# Patient Record
Sex: FEMALE | Race: WHITE | NOT HISPANIC OR LATINO
[De-identification: names, ages, dates, MRNs, and addresses within clinical notes are randomized per-mention and may not be internally consistent; named-entity substitution may affect disease eponyms.]

---

## 2017-02-01 ENCOUNTER — LAB SERVICES (OUTPATIENT)
Dept: OTHER | Age: 37
End: 2017-02-01

## 2017-02-01 ENCOUNTER — AMBULATORY - HEALTHEAST (OUTPATIENT)
Dept: MULTI SPECIALTY CLINIC | Facility: CLINIC | Age: 37
End: 2017-02-01

## 2017-02-01 ENCOUNTER — MYAURORA ACCOUNT LINK (OUTPATIENT)
Dept: OTHER | Age: 37
End: 2017-02-01

## 2017-02-01 ENCOUNTER — CHARTING TRANS (OUTPATIENT)
Dept: OBGYN | Age: 37
End: 2017-02-01

## 2017-02-01 LAB
HPV_EXT - HISTORICAL: NORMAL
PAP SMEAR - HIM PATIENT REPORTED: NORMAL

## 2017-02-07 LAB — AP REPORT: NORMAL

## 2017-02-09 ENCOUNTER — LAB SERVICES (OUTPATIENT)
Dept: OTHER | Age: 37
End: 2017-02-09

## 2017-02-10 LAB
ESTRADIOL SERPL-MCNC: 43.37 PG/ML
FOLLICLE STIMULATING: 4.75 M[IU]/ML (ref 2–25)
HPV I/H RISK 4 DNA CVX QL NAA+PROBE: NORMAL

## 2017-02-12 LAB — MIS SERPL-MCNC: 2.72 NG/ML (ref 0.18–11.71)

## 2017-10-03 ENCOUNTER — LAB SERVICES (OUTPATIENT)
Dept: OTHER | Age: 37
End: 2017-10-03

## 2017-10-03 ENCOUNTER — MYAURORA ACCOUNT LINK (OUTPATIENT)
Dept: OTHER | Age: 37
End: 2017-10-03

## 2017-10-03 ENCOUNTER — CHARTING TRANS (OUTPATIENT)
Dept: URGENT CARE | Age: 37
End: 2017-10-03

## 2017-10-03 LAB — DEPRECATED S PYO AG THROAT QL EIA: NEGATIVE

## 2017-10-03 ASSESSMENT — PAIN SCALES - GENERAL: PAINLEVEL_OUTOF10: 5

## 2018-01-29 ENCOUNTER — CHARTING TRANS (OUTPATIENT)
Dept: OTHER | Age: 38
End: 2018-01-29

## 2018-02-05 ENCOUNTER — CHARTING TRANS (OUTPATIENT)
Dept: OTHER | Age: 38
End: 2018-02-05

## 2018-11-28 VITALS
TEMPERATURE: 97.2 F | RESPIRATION RATE: 14 BRPM | HEART RATE: 72 BPM | DIASTOLIC BLOOD PRESSURE: 66 MMHG | OXYGEN SATURATION: 100 % | SYSTOLIC BLOOD PRESSURE: 102 MMHG

## 2018-11-29 VITALS
DIASTOLIC BLOOD PRESSURE: 64 MMHG | BODY MASS INDEX: 22.71 KG/M2 | WEIGHT: 133 LBS | HEIGHT: 64 IN | SYSTOLIC BLOOD PRESSURE: 106 MMHG

## 2020-03-11 ENCOUNTER — OFFICE VISIT - HEALTHEAST (OUTPATIENT)
Dept: FAMILY MEDICINE | Facility: CLINIC | Age: 40
End: 2020-03-11

## 2020-03-11 DIAGNOSIS — Z00.00 ROUTINE GENERAL MEDICAL EXAMINATION AT A HEALTH CARE FACILITY: ICD-10-CM

## 2020-03-11 DIAGNOSIS — M75.82 ROTATOR CUFF TENDONITIS, LEFT: ICD-10-CM

## 2020-03-11 DIAGNOSIS — Z13.0 SCREENING FOR DEFICIENCY ANEMIA: ICD-10-CM

## 2020-03-11 DIAGNOSIS — Z83.3 FAMILY HISTORY OF DIABETES MELLITUS: ICD-10-CM

## 2020-03-11 DIAGNOSIS — Z13.220 SCREENING FOR CHOLESTEROL LEVEL: ICD-10-CM

## 2020-03-11 DIAGNOSIS — Z12.31 ENCOUNTER FOR SCREENING MAMMOGRAM FOR BREAST CANCER: ICD-10-CM

## 2020-03-11 LAB
ALBUMIN SERPL-MCNC: 4.1 G/DL (ref 3.5–5)
ALP SERPL-CCNC: 67 U/L (ref 45–120)
ALT SERPL W P-5'-P-CCNC: 25 U/L (ref 0–45)
ANION GAP SERPL CALCULATED.3IONS-SCNC: 9 MMOL/L (ref 5–18)
AST SERPL W P-5'-P-CCNC: 21 U/L (ref 0–40)
BILIRUB SERPL-MCNC: 0.6 MG/DL (ref 0–1)
BUN SERPL-MCNC: 7 MG/DL (ref 8–22)
CALCIUM SERPL-MCNC: 9.3 MG/DL (ref 8.5–10.5)
CHLORIDE BLD-SCNC: 105 MMOL/L (ref 98–107)
CHOLEST SERPL-MCNC: 216 MG/DL
CO2 SERPL-SCNC: 25 MMOL/L (ref 22–31)
CREAT SERPL-MCNC: 0.65 MG/DL (ref 0.6–1.1)
FASTING STATUS PATIENT QL REPORTED: YES
GFR SERPL CREATININE-BSD FRML MDRD: >60 ML/MIN/1.73M2
GLUCOSE BLD-MCNC: 88 MG/DL (ref 70–125)
HBA1C MFR BLD: 5 %
HDLC SERPL-MCNC: 36 MG/DL
HGB BLD-MCNC: 13.5 G/DL (ref 12–16)
LDLC SERPL CALC-MCNC: 144 MG/DL
POTASSIUM BLD-SCNC: 4.2 MMOL/L (ref 3.5–5)
PROT SERPL-MCNC: 6.8 G/DL (ref 6–8)
SODIUM SERPL-SCNC: 139 MMOL/L (ref 136–145)
TRIGL SERPL-MCNC: 182 MG/DL

## 2020-03-11 ASSESSMENT — MIFFLIN-ST. JEOR: SCORE: 1293.65

## 2020-06-30 ENCOUNTER — HOSPITAL ENCOUNTER (OUTPATIENT)
Dept: MAMMOGRAPHY | Facility: CLINIC | Age: 40
Discharge: HOME | End: 2020-06-30
Attending: NURSE PRACTITIONER

## 2020-06-30 DIAGNOSIS — Z12.31 ENCOUNTER FOR SCREENING MAMMOGRAM FOR BREAST CANCER: ICD-10-CM

## 2020-07-09 ENCOUNTER — HOSPITAL ENCOUNTER (OUTPATIENT)
Dept: MAMMOGRAPHY | Facility: CLINIC | Age: 40
Discharge: HOME | End: 2020-07-09
Attending: NURSE PRACTITIONER

## 2020-07-09 DIAGNOSIS — N64.89 BREAST ASYMMETRY: ICD-10-CM

## 2020-10-21 ENCOUNTER — OFFICE VISIT - HEALTHEAST (OUTPATIENT)
Dept: PHYSICAL THERAPY | Facility: REHABILITATION | Age: 40
End: 2020-10-21

## 2020-10-21 DIAGNOSIS — M75.82 ROTATOR CUFF TENDONITIS, LEFT: ICD-10-CM

## 2020-11-20 ENCOUNTER — OFFICE VISIT - HEALTHEAST (OUTPATIENT)
Dept: PHYSICAL THERAPY | Facility: REHABILITATION | Age: 40
End: 2020-11-20

## 2020-11-20 DIAGNOSIS — M75.82 ROTATOR CUFF TENDONITIS, LEFT: ICD-10-CM

## 2020-11-21 ENCOUNTER — AMBULATORY - HEALTHEAST (OUTPATIENT)
Dept: NURSING | Facility: CLINIC | Age: 40
End: 2020-11-21

## 2021-06-04 VITALS
BODY MASS INDEX: 23.53 KG/M2 | WEIGHT: 141.25 LBS | HEIGHT: 65 IN | DIASTOLIC BLOOD PRESSURE: 76 MMHG | HEART RATE: 76 BPM | SYSTOLIC BLOOD PRESSURE: 110 MMHG

## 2021-06-06 NOTE — PROGRESS NOTES
FEMALE PREVENTIVE EXAM    Assessment & Plan   1. Routine general medical examination at a health care facility  Fasting labs. Up to date on pap, due in 2022  - Influenza, Seasonal Quad, PF =/> 6months    2. Rotator cuff tendonitis, left  Exam and history consistent with rotator cuff tendonitis.  Discussed low concern for tear based on age and lack of injury so would not recommend advanced imaging currently.  Referral to physical therapy for treatment and if no improvement consider MRI  - Ambulatory referral to Physical Therapy    3. Family history of diabetes mellitus  Mother and brother. Counseled on risk, lifestyle interventions and recommendation for annual screening.   - Comprehensive Metabolic Panel  - Glycosylated Hemoglobin A1c    4. Encounter for screening mammogram for breast cancer  - Mammo Screening Bilateral; Future    5. Screening for cholesterol level  - Lipid Cascade    6. Screening for deficiency anemia  - Hemoglobin    Recommend repeat pap smear if normal every five years, Recommended adequate calcium intake/osteoporosis prevention, Discussed breast cancer screening guidelines, Discussed colon cancer screening at age 50, 45 if -American and Discussed diet, including moderation of portions sizes, avoiding eating out and fast food and increase in fruits and vegetables    Sherry Seymour CNP    Subjective:   Chief Complaint:  Establish Care and Annual Exam (fasting - not due for a pap)    HPI:  Deborah Patten is a 39 y.o. female who presents for routine physical exam.  She is a new patient.  Most recently seen in [place]. PMH notable for depression, anxiety. Works as an . .      [facility] diabetes in mother and brother.  She admits she has not been exercising regularly due to long work hours for the past 2-3 months.  She typically works out 2-3 times a week. Diet is relatively healthy.  Concerned about own risks and has noted some fatigue and increased thirst recently.      Left  "shoulder:  Pain for the last 3-6 months with reaching and overhead movements.  Pain with lying on the left side in bed. No known injury but has generally been active with working out.  No weakness noted. No numbness/tingling. No prior history of injury or pain.      OB/Gyn History:   Menstrual history: regular periods  Date of previous pap: 2017  History of abnormal pap: none    Preventive Health:  Reviewed and recommended screening and treatment recommendations:  Mammography: not yet started  Colonoscopy: No [facility]  Immunizations: up to date    Health Habits:    Exercise: typically 2-3 times a week, not currently due to work.  Calcium intake/Osteoporosis prevention: MVI    PMH:   There is no problem list on file for this patient.      No past medical history on file.    Current Medications: Reviewed   No current outpatient medications on file prior to visit.     No current facility-administered medications on file prior to visit.        Allergies:  Reviewed  is allergic to septra [sulfamethoxazole-trimethoprim].    Social History:  Social History     Occupational History     Not on file   Tobacco Use     Smoking status: Never Smoker     Smokeless tobacco: Never Used   Substance and Sexual Activity     Alcohol use: Not on file     Drug use: Not on file     Sexual activity: Not on file       Family History:   No family history on file.      Review of Systems:  Complete head to toe review of systems is otherwise negative except as above.    Objective:    /76 (Patient Site: Right Arm, Patient Position: Sitting, Cuff Size: Adult Regular)   Pulse 76   Ht 5' 4.5\" (1.638 m)   Wt 141 lb 4 oz (64.1 kg)   LMP 2020 (Exact Date)   Breastfeeding No   BMI 23.87 kg/m      GENERAL: Alert, well-appearing female .   PSYCH: Pleasant mood, affect appropriate  SKIN: No atypical lesions  EYES: Conjunctiva pink, sclera white, no exudates. GALI.  EOMs intact.   EARS: TMs pearly grey, no bulging, redness, retraction. "   MOUTH: Pharynx moist, pink without exudate. No tonsillar enlargement  NECK: No lymphadenopathy. Thyroid borders smooth without enlargement, nodules.   CV: Regular rate and rhythm without murmurs, rubs or gallops.  RESP: Lung sounds clear   ABDOMEN: BS+. Abdomen soft.  No organomegaly  BREASTS: Breasts symmetric, no dimpling, masses or skin discolorations seen. Areolas and nipples symmetric without discharge. On palpation, breast tissue supple and nontender. No masses or nodules. Axillary and epitrochlear lymph nodes nonpalpable.   PV :  No edema  MSK:  Shoulders in alignment. No AC or SC TTP.  Full ROM of shoulder, pain with abduction past 135 degrees. Negative impingement testing.  UE strength 5/5 bilaterally.  No biceps tenderness.

## 2021-06-06 NOTE — PATIENT INSTRUCTIONS - HE
Recommendations from today's visit                                                       1. Left shoulder:  This seems most consistent with rotator cuff tendonitis.  I recommend physical therapy and placed a referral order for this.      2. Health maintenance:  We order a screening mammogram today.  We will continue with annual mammograms until the age of at least 55 and then could consider adjusting frequency. I do recommend a breast exam in clinic every year and annual diabetes screening with labs as well.        Next appointment: one year    To reschedule your appointment, please call the clinic directly at [phone].   It was a pleasure seeing you today! I look forward to seeing you again.

## 2021-06-12 NOTE — PROGRESS NOTES
[facility]   Shoulder Initial Evaluation    Patient Name: Breanna Patten  Date of evaluation: 10/21/2020  Referral Diagnosis: Rotator cuff tendonitis, left  Referring provider: Sherry Seymour CNP  Visit Diagnosis:     ICD-10-CM    1. Rotator cuff tendonitis, left  M75.82        Precautions / Restrictions : none       Assessment:      Impairments in  pain, posture, ROM, joint mobility, strength  Patient's signs and symptoms are consistent with left shoulder pain with altered biomechanics.  Patient responded well to manual therapy and HEP.  Prognosis to achieve goals is  good   Pt. is appropriate for skilled PT intervention as outlined in the Plan of Care (POC).    Goals:  Pt. will demonstrate/verbalize independence in self-management of condition in : 6 weeks  Pt. will be independent with home exercise program in : 6 weeks  Pt. will have improved quality of sleep: with less pain;waking less times/night;in 6 weeks  Pt. will improve posture : and demonstrate posture with minimal to no cuing;in standing;in sitting;in 6 weeks  Patient will transfer: supine/sit;for in/out of bed;with no pain;in 6 weeks  Patient will reach / maintain arm movement: forward;overhead;behind;for home chores;for dressing;with no pain;with less difficulty;in 6 weeks    No data recorded    Patient's expectations/goals are realistic.    Barriers to Learning or Achieving Goals:  No Barriers.       Plan / Patient Instructions:        Plan of Care:   Communication with: Referral Source  Patient Related Instruction: Nature of Condition;Posture;Precautions;Treatment plan and rationale;Next steps;Expected outcome;Self Care instruction;Basis of treatment;Body mechanics  Times per Week: 2-1  Number of Weeks: 6  Number of Visits: 10  Discharge Planning: to include HEP and self management strategies  Precautions / Restrictions : none  Therapeutic Exercise: Stretching;Strengthening;ROM  Neuromuscular Reeducation: posture;core  Manual  Therapy: soft tissue mobilization;myofascial release;joint mobilization      Pt. is in agreement with the Plan of Care  A Home Exercise Program (HEP) was initiated today.  Pt. was instructed in exercises by PT and patient was given a handout with detailed instructions.  Plan for next visit: review HEP, continue manual therapy, progress strength as tolerated.  .   Subjective:           History of Present Illness:    Breanna is a 39 y.o. female who presents to therapy today with complaints of left shoulder pain. Date of onset/duration of symptoms is 2020. Onset was gradual. Symptoms are constant and not improving. She denies history of similar symptoms. She describes their previous level of function as not limited    Pain Ratin  Pain rating at best: 1  Pain rating at worst: 7  Pain description: aching, pain and soreness    Functional limitations are described as occurring with:   lifting  reaching at shoulder height and overhead  sleeping  transitional movements getting in  bed and getting out of  bed         Objective:      Note: Items left blank indicates the item was not performed or not indicated at the time of the evaluation.    Patient Outcome Measures :    No data recorded   Scores range from 0-100%, where a score of 0% represents minimal pain and maximal function. The minimal clincically important difference is a score reduction of 10%.    Shoulder Examination  1. Rotator cuff tendonitis, left       Involved side: Left  Posture Observation:      General sitting posture is  fair.  General standing posture is fair.  Cervical:  Mild forward head  Shoulder/Thoracic complex: Moderate bilateral scapular protraction   Mildly increased upper thoracic kyphosis  Cervical Clearing: Normal      Upper Extremity ROM/Strength:           Right           Left     * indicates pain   AROM   PROM   MMT/5   AROM   PROM   MMT/5     Shoulder Flexion 180     WNL      5   WNL pain from 120     4- limited by pain       Shoulder Extension  60                        Shoulder Abduction  180    WNL      5   WNL pain from 75      4-limited by pain     Shoulder ER  70  @ side 90  5 70 with pain  4+     Shoulder IR/Ext reach  T3   5   5     Shoulder GH ER  90            Shoulder GH IR  70            Elbow Flexion  150    WNL    5     WNL      5     Elbow Extension 0    WNL      5   WNL      5       Flexibility & Palpation: tender to palpation with myofascial restrictions: deltoid, upper trap, levator, infraspinatus, pec minor    Joint Assessment:  Sternoclavicular Joint Assessment: NT  Acromioclavicular Joint Assessment: WNL  Scapulothoracic Joint Assessment: Hypomobile.  Glenohumeral Joint Assessment:Hypomobile.    Shoulder Special Tests      Impingement Cluster Right (+/-) Left (+/-) Rotator Cuff Tests Right (+/-) Left (+/-)   Lia[place]vador - - Drop Arm Sign  -   Painful Arc - - Hornblowers     Infraspinatus Test - - ERLS     AC Tests Right (+/-) Left (+/-) IRLS     Active Compression   Labral Tests Right (+/-) Left (+/-)   Crossbody Adduction   Biceps Load Test II  -   AC Resisted Extension   Jerk Test     GH Instability Tests Right (+/-) Left (+/-) Sabrina Test     Sulcus Sign  - Biceps Tests Right (+/-) Left (+/-)   Apprehension   Speed     Relocation   [place] prather     Other:   Other:     Other:   Other:           Treatment Today      Therapeutic Exercises:  Exercise #1: AROM shoulder flexion and abduction  Comment #1: 10 each bilateral  Exercise #2: scapular retraction with depression  Comment #2: 10       Manual therapy:  MFR layers 1-3 left:  Deltoid, upper trap, levator, infraspinatus,     Manual therapy:  left shoulder inferior mobilization    Rate/grade Target  Direction  Relative movement Location in range Patient position   2,3 Humeral head Inferior  Inferior glide of humeral head on glenoid. Varying degrees of shoulder abduction from neutral to 90. Supine       Manual therapy:  left shoulder posterior  mobilization    Rate/grade Target  Direction  Relative movement Location in range Patient position   2,3 Humeral head Posterior  Posterior glide of humeral head on glenoid. 90 degrees of shoulder abduction and in varying degrees of IR from neutral to end-range. Supine           TREATMENT MINUTES COMMENTS   Evaluation 25    Self-care/ Home management     Manual therapy 20    Neuromuscular Re-education     Therapeutic Activity     Therapeutic Exercises 10    Gait training     Modality__________________                Total 55    Blank areas are intentional and mean the treatment did not include these items.     PT Evaluation Code: (Please list factors)  Patient History/Comorbidities:   Patient Active Problem List   Diagnosis     Family history of diabetes mellitus    No past medical history on file.   Examination: as above  Clinical Presentation: stable  Clinical Decision Making: low complexity         Patient History/  Comorbidities Examination  (body structures and functions, activity limitations, and/or participation restrictions) Clinical Presentation Clinical Decision Making (Complexity)   No documented Comorbidities or personal factors 1-2 Elements Stable and/or uncomplicated Low   1-2 documented comorbidities or personal factor 3 Elements Evolving clinical presentation with changing characteristics Moderate   3-4 documented comorbidities or personal factors 4 or more Unstable and unpredictable High              Kelby Saha, PT  10/21/2020  12:25 PM

## 2021-06-13 NOTE — PROGRESS NOTES
[facility] Discharge Summary  Patient Name: Breanna Patten  Date: 1/21/2021  Referral Diagnosis: Rotator cuff tendonitis, left  Referring provider: Sherry Seymour CNP  Visit Diagnosis:   1. Rotator cuff tendonitis, left         Goals:  No data recorded  No data recorded    Patient was seen for 2 visits physical therapy.    The patient attended therapy initially, but did not finish the therapy sessions prescribed.  Goals were not fully achieved. Explanation for goals not achieved: The patient discontinued therapy, did not return.    Therapy will be discontinued at this time.  Please see progress note dated 11/20/2020 for patient status.      Thank you for your referral.  Kelby MOE Saha, PT  1/21/2021  9:09 AM         [facility] Daily Progress     Patient Name: Breanna Patten  Date: 11/20/2020  Visit #: 2/10  Referral Diagnosis: Rotator cuff tendonitis, left  Referring provider: Sherry Seymour CNP  Visit Diagnosis:     ICD-10-CM    1. Rotator cuff tendonitis, left  M75.82        Precautions / Restrictions : none       Assessment:     Response to Intervention:  Tolerated well without increased pain.    Symptoms are consistent with:  Rotator cuff tendonitis  Patient is appropriate to continue with skilled physical therapy intervention, as indicated by initial plan of care.    Goal Status:  Pt. will demonstrate/verbalize independence in self-management of condition in : 6 weeks  Pt. will be independent with home exercise program in : 6 weeks  Pt. will have improved quality of sleep: with less pain;waking less times/night;in 6 weeks  Pt. will improve posture : and demonstrate posture with minimal to no cuing;in standing;in sitting;in 6 weeks  Patient will transfer: supine/sit;for in/out of bed;with no pain;in 6 weeks  Patient will reach / maintain arm movement: forward;overhead;behind;for home chores;for dressing;with no pain;with less difficulty;in 6 weeks    No data  "recorded  Other functional progress:           Plan / Patient Education:     Continue with initial plan of care.  Progress with home program as tolerated.       Subjective:     Pain Rating:  Resting 4  Activity:  6    Response to last treatment: sore for 2 days  HEP- Frequency: 0-2x/day, Questions or difficulties:  none.    Patient reports:      Worst at night and in the morning.    Pain and ache all the time, with sharp shooting pain with reaching.      Objective:              Treatment Today   Manual Therapy  MFR layers 1-3 left: upper trap, levator, pec minor, supraspinatus, infraspinatus, lat dorsi,     Exercises:  Exercise #1: AROM shoulder flexion and abduction  Comment #1: 5 each bilateral  Exercise #2: scapular retraction with depression  Comment #2: 10--re-oinstruction with cuing and demonstration  Exercise #3: shoulder isometrics: flexion, abduction, extension, IR, ER  Comment #3: 5\" x 5 each left            TREATMENT MINUTES COMMENTS   Evaluation     Self-care/ Home management     Manual therapy 15 See above.   Neuromuscular Re-education     Therapeutic Activity     Therapeutic Exercises 12 See exercise flow-sheet for details.    Gait training     Modality__________________                Total 27    Blank areas are intentional and mean the treatment did not include these items.       Kelby Saha, PT  11/20/2020     "

## 2021-06-16 PROBLEM — Z83.3 FAMILY HISTORY OF DIABETES MELLITUS: Status: ACTIVE | Noted: 2020-03-11

## 2021-06-17 ENCOUNTER — OFFICE VISIT - HEALTHEAST (OUTPATIENT)
Dept: FAMILY MEDICINE | Facility: CLINIC | Age: 41
End: 2021-06-17

## 2021-06-17 DIAGNOSIS — E78.2 MIXED HYPERLIPIDEMIA: ICD-10-CM

## 2021-06-17 DIAGNOSIS — Z12.31 VISIT FOR SCREENING MAMMOGRAM: ICD-10-CM

## 2021-06-17 DIAGNOSIS — Z00.00 ROUTINE GENERAL MEDICAL EXAMINATION AT A HEALTH CARE FACILITY: ICD-10-CM

## 2021-06-17 DIAGNOSIS — Z83.3 FAMILY HISTORY OF DIABETES MELLITUS: ICD-10-CM

## 2021-06-17 DIAGNOSIS — Z13.0 SCREENING FOR DEFICIENCY ANEMIA: ICD-10-CM

## 2021-06-17 DIAGNOSIS — Z13.21 ENCOUNTER FOR VITAMIN DEFICIENCY SCREENING: ICD-10-CM

## 2021-06-17 LAB
ANION GAP SERPL CALCULATED.3IONS-SCNC: 11 MMOL/L (ref 5–18)
BUN SERPL-MCNC: 7 MG/DL (ref 8–22)
CALCIUM SERPL-MCNC: 9.2 MG/DL (ref 8.5–10.5)
CHLORIDE BLD-SCNC: 105 MMOL/L (ref 98–107)
CHOLEST SERPL-MCNC: 226 MG/DL
CO2 SERPL-SCNC: 23 MMOL/L (ref 22–31)
CREAT SERPL-MCNC: 0.7 MG/DL (ref 0.6–1.1)
FASTING STATUS PATIENT QL REPORTED: YES
GFR SERPL CREATININE-BSD FRML MDRD: >60 ML/MIN/1.73M2
GLUCOSE BLD-MCNC: 88 MG/DL (ref 70–125)
HBA1C MFR BLD: 5 %
HDLC SERPL-MCNC: 43 MG/DL
HGB BLD-MCNC: 13.8 G/DL (ref 12–16)
LDLC SERPL CALC-MCNC: 158 MG/DL
POTASSIUM BLD-SCNC: 4.4 MMOL/L (ref 3.5–5)
SODIUM SERPL-SCNC: 139 MMOL/L (ref 136–145)
TRIGL SERPL-MCNC: 124 MG/DL

## 2021-06-17 ASSESSMENT — MIFFLIN-ST. JEOR: SCORE: 1279.47

## 2021-06-18 LAB — 25(OH)D3 SERPL-MCNC: 24.3 NG/ML (ref 30–80)

## 2021-06-22 ENCOUNTER — COMMUNICATION - HEALTHEAST (OUTPATIENT)
Dept: FAMILY MEDICINE | Facility: CLINIC | Age: 41
End: 2021-06-22

## 2021-06-26 ENCOUNTER — HEALTH MAINTENANCE LETTER (OUTPATIENT)
Age: 41
End: 2021-06-26

## 2021-06-26 NOTE — PROGRESS NOTES
FEMALE PREVENTIVE EXAM    Assessment & Plan   1. Routine general medical examination at a health care facility  Fasting labs. Will be due for pap in 2022.  Order for mammography.      2. Mixed hyperlipidemia  Recheck labs.  Advised on regular exercise and healthy diet  - Lipid Cascade    3. Family history of diabetes mellitus  - Glycosylated Hemoglobin A1c  - Basic Metabolic Panel    4. Visit for screening mammogram  - Mammo Screening Bilateral; Future    5. Encounter for vitamin deficiency screening  - Vitamin D, Total (25-Hydroxy)    6. Screening for deficiency anemia  - Hemoglobin    Recommend repeat pap smear if normal every five years, Discussed breast cancer screening guidelines, Discussed colon cancer screening at age 50, 45 if -American and Discussed diet, including moderation of portions sizes, avoiding eating out and fast food and increase in fruits and vegetables    Sherry Seymour CNP    Subjective:   Chief Complaint:  Annual Exam    HPI:  Breanna Patten is a 40 y.o. female who presents for routine physical exam.    Works as an . .  She has no major concerns today.  Would like to discuss scheduling a dermatology skin check and general eye exam.      OB/Gyn History:  Menstrual history: regular periods   Date of previous pap: none  History of abnormal pap: 2017    Preventive Health:  Reviewed and recommended screening and treatment recommendations:  Mammography: due  Colonoscopy: no [facility]  Immunizations: up to date    Health Habits:    Exercise: not regularly.  Calcium intake/Osteoporosis prevention: yes    PMH:   Patient Active Problem List   Diagnosis     Family history of diabetes mellitus       No past medical history on file.    Current Medications: Reviewed   Current Outpatient Medications on File Prior to Visit   Medication Sig Dispense Refill     multivitamin (MULTI-DAY ORAL) Take by mouth.       No current facility-administered medications on file prior to visit.   "      Allergies:  Reviewed  is allergic to septra [sulfamethoxazole-trimethoprim].    Social History:  Social History     Occupational History     Not on file   Tobacco Use     Smoking status: Never Smoker     Smokeless tobacco: Never Used   Substance and Sexual Activity     Alcohol use: Not on file     Drug use: Not on file     Sexual activity: Not on file       Family History:   Family History   Problem Relation Age of Onset     Diabetes type II Mother      Breast cancer Other         maternal great aunt     Breast cancer Cousin 40         Review of Systems:  Complete head to toe review of systems is otherwise negative except as above.    Objective:    /60   Pulse 66   Ht 5' 4.25\" (1.632 m)   Wt 139 lb (63 kg)   SpO2 98%   BMI 23.67 kg/m      GENERAL: Alert, well-appearing  PSYCH: Flat affect.   SKIN: No atypical lesions  EYES: Conjunctiva pink, sclera white, no exudates. GALI.  EOMs intact.   EARS: TMs pearly grey, no bulging, redness, retraction.   MOUTH: Pharynx moist, pink without exudate. No tonsillar enlargement  NECK: No lymphadenopathy. Thyroid borders smooth without enlargement, nodules.   CV: Regular rate and rhythm without murmurs, rubs or gallops.  RESP: Lung sounds clear  ABDOMEN: BS+. Abdomen soft.  No organomegaly  BREASTS: Breasts symmetric, no dimpling, masses or skin discolorations seen. Areolas and nipples symmetric without discharge. On palpation, breast tissue supple and nontender. No masses or nodules. Axillary and epitrochlear lymph nodes nonpalpable.   PV :  No edema        "

## 2021-07-01 ENCOUNTER — RECORDS - HEALTHEAST (OUTPATIENT)
Dept: MAMMOGRAPHY | Facility: CLINIC | Age: 41
End: 2021-07-01

## 2021-07-01 DIAGNOSIS — Z12.31 ENCOUNTER FOR SCREENING MAMMOGRAM FOR MALIGNANT NEOPLASM OF BREAST: ICD-10-CM

## 2021-07-06 VITALS
DIASTOLIC BLOOD PRESSURE: 60 MMHG | HEIGHT: 64 IN | OXYGEN SATURATION: 98 % | SYSTOLIC BLOOD PRESSURE: 120 MMHG | WEIGHT: 139 LBS | BODY MASS INDEX: 23.73 KG/M2 | HEART RATE: 66 BPM

## 2021-07-26 ENCOUNTER — OFFICE VISIT (OUTPATIENT)
Dept: FAMILY MEDICINE | Facility: CLINIC | Age: 41
End: 2021-07-26
Payer: COMMERCIAL

## 2021-07-26 VITALS
BODY MASS INDEX: 23.37 KG/M2 | WEIGHT: 137.2 LBS | SYSTOLIC BLOOD PRESSURE: 110 MMHG | HEART RATE: 69 BPM | DIASTOLIC BLOOD PRESSURE: 68 MMHG | OXYGEN SATURATION: 98 %

## 2021-07-26 DIAGNOSIS — R19.03 RLQ ABDOMINAL MASS: Primary | ICD-10-CM

## 2021-07-26 PROCEDURE — 99214 OFFICE O/P EST MOD 30 MIN: CPT | Performed by: NURSE PRACTITIONER

## 2021-07-26 NOTE — PATIENT INSTRUCTIONS
Recommendations from today's visit                                                       Abdominal mass:  This may be a hernia or a lipoma.  We will look into this with an abdominal ultrasound.  To schedule with radiology:  [phone]    Next appointment: as scheduled    To reschedule your appointment, please call the clinic directly at [phone].   It was a pleasure seeing you today! I look forward to seeing you again.

## 2021-07-26 NOTE — PROGRESS NOTES
Assessment & Plan   1. RLQ abdominal mass  Newly noted soft RLQ abdominal mass.  This has very indistinct borders on exam and is not reducible.  Feels more consistent with lipoma though history of its sudden appearance more consistent with hernia.  Ultrasound ordered to assess and will follow up with results.   - US Abdomen Limited; Future    Sherry Seymour, CNP    Subjective   Chief Complaint:  Possible hernia    HPI:   Breanna Patten is a 40 year old female who presents for RLQ mass.      She states she first noted mass of RLQ a week and a half ago.  Looked in the mirror and noted contour of abdomen was different.  This has not changed in size since that time.  Mildly uncomfortable.  Achy. No pain.  She has avoided exercise since that time.        Allergies:  is allergic to septra [sulfamethoxazole w/trimethoprim].    SH/FH:  Social History and Family History reviewed and updated.   Tobacco Status:  She  reports that she has never smoked. She has never used smokeless tobacco.    Review of Systems:  A complete head to toe ROS is negative unless otherwise noted in HPI    Objective     Vitals:    07/26/21 1404   BP: 110/68   BP Location: Left arm   Pulse: 69   SpO2: 98%   Weight: 62.2 kg (137 lb 3.2 oz)       Physical Exam:  GENERAL: Alert, well-appearing  ABD:  Visible bulge in RLQ upon standing. Upon palpation this is approximately 6cm x 5cm in size though has very indistinct borders. Very mildly tender.  Not reducible and no change in size with valsalva.

## 2021-07-28 ENCOUNTER — HOSPITAL ENCOUNTER (OUTPATIENT)
Dept: ULTRASOUND IMAGING | Facility: CLINIC | Age: 41
Discharge: HOME | End: 2021-07-28
Attending: NURSE PRACTITIONER | Admitting: NURSE PRACTITIONER
Payer: COMMERCIAL

## 2021-07-28 DIAGNOSIS — R19.03 RLQ ABDOMINAL MASS: ICD-10-CM

## 2021-07-28 PROCEDURE — 76705 ECHO EXAM OF ABDOMEN: CPT

## 2021-07-31 ENCOUNTER — MYC MEDICAL ADVICE (OUTPATIENT)
Dept: FAMILY MEDICINE | Facility: CLINIC | Age: 41
End: 2021-07-31

## 2021-07-31 DIAGNOSIS — D17.30 LIPOMA OF SKIN AND SUBCUTANEOUS TISSUE: Primary | ICD-10-CM

## 2021-08-18 ENCOUNTER — OFFICE VISIT (OUTPATIENT)
Dept: SURGERY | Facility: CLINIC | Age: 41
End: 2021-08-18
Attending: NURSE PRACTITIONER
Payer: COMMERCIAL

## 2021-08-18 VITALS — BODY MASS INDEX: 23.84 KG/M2 | SYSTOLIC BLOOD PRESSURE: 118 MMHG | DIASTOLIC BLOOD PRESSURE: 64 MMHG | WEIGHT: 140 LBS

## 2021-08-18 DIAGNOSIS — D17.30 LIPOMA OF SKIN AND SUBCUTANEOUS TISSUE: ICD-10-CM

## 2021-08-18 PROCEDURE — 99204 OFFICE O/P NEW MOD 45 MIN: CPT | Performed by: SURGERY

## 2021-08-18 NOTE — LETTER
8/18/2021         RE: Breanna Patten  [address] [place] [address]        Dear Colleague,    Thank you for referring your patient, Breanna Patten, to the [facility] SURGERY [facility] AND BARIATRICS [facility]ARE [place]. Please see a copy of my visit note below.    This is a consultation from Sherry Seymour CNP, for a mass on the abdomen    HPI: Pt is here with concerns about a subcutaneous mass located RLQ of her abdomin.  It has been present for the last month, since she first noticed it..   This lesion is not tender.  The lesion has not drained.      Allergies:Septra [sulfamethoxazole w/trimethoprim]    No past medical history on file.    No past surgical history on file.    REVIEW OF SYSTEMS:  10 point ROS is negative except for; as mentioned above.    CURRENT MEDS:    Current Outpatient Medications:      multivitamin (MULTI-DAY ORAL), [MULTIVITAMIN (MULTI-DAY ORAL)] Take by mouth., Disp: , Rfl:     There were no vitals taken for this visit.  There is no height or weight on file to calculate BMI.    EXAM:  GENERAL:Well developed she appears her stated age  HEAD & NECK: Extraocular motions intact, anicteric sclera,  ABDOMEN: Soft and nondistended, positive bowel sounds  LUNGS:  CTA  HEART:  RRR  EXTREMITIES: Full mobility,   INTEGUMENT: The patient has a subcutaneous lesion located RLQ of the ab.   The lesion is 7 cm cm wide.    EXAM: US ABDOMEN LIMITED  LOCATION: [facility]  DATE/TIME: 7/28/2021 9:37 AM     INDICATION: Right lower quadrant lump.  COMPARISON: None.  TECHNIQUE: Limited abdominal ultrasound.     FINDINGS:     Scans of the right lower quadrant ventral wall were performed without with Valsalva. No evidence of fascial defects nor hernia. 7.6 x 6.9 x 1.4 cm lesion which is isoechoic with subcutaneous fat suggesting a lipoma.                                                                      IMPRESSION:  1.  Probable ventral wall lipoma right lower  quadrant.      Assessment/Plan: The pt has a  7.6 cm  subcutaneous lesion located on the RLQ of the abdomin.    This lesion is likely either a Lipoma. These lesion is growing in size and/or is painful at times.  With these features I recommend removal of this lesion.     She would like to have these lesions removed. I discussed this with she. I discussed the risk of bleeding and infection with the patient. We will get this scheduled through our clinic.    Anibal Brock MD ,MD  [phone]  [facility] Department of Surgery      Again, thank you for allowing me to participate in the care of your patient.        Sincerely,        Anibal Brock MD

## 2021-08-18 NOTE — PROGRESS NOTES
This is a consultation from Sherry Seymour CNP, for a mass on the abdomen    HPI: Pt is here with concerns about a subcutaneous mass located RLQ of her abdomin.  It has been present for the last month, since she first noticed it..   This lesion is not tender.  The lesion has not drained.      Allergies:Septra [sulfamethoxazole w/trimethoprim]    No past medical history on file.    No past surgical history on file.    REVIEW OF SYSTEMS:  10 point ROS is negative except for; as mentioned above.    CURRENT MEDS:    Current Outpatient Medications:      multivitamin (MULTI-DAY ORAL), [MULTIVITAMIN (MULTI-DAY ORAL)] Take by mouth., Disp: , Rfl:     There were no vitals taken for this visit.  There is no height or weight on file to calculate BMI.    EXAM:  GENERAL:Well developed she appears her stated age  HEAD & NECK: Extraocular motions intact, anicteric sclera,  ABDOMEN: Soft and nondistended, positive bowel sounds  LUNGS:  CTA  HEART:  RRR  EXTREMITIES: Full mobility,   INTEGUMENT: The patient has a subcutaneous lesion located RLQ of the ab.   The lesion is 7 cm cm wide.    EXAM: US ABDOMEN LIMITED  LOCATION: [facility]  DATE/TIME: 7/28/2021 9:37 AM     INDICATION: Right lower quadrant lump.  COMPARISON: None.  TECHNIQUE: Limited abdominal ultrasound.     FINDINGS:     Scans of the right lower quadrant ventral wall were performed without with Valsalva. No evidence of fascial defects nor hernia. 7.6 x 6.9 x 1.4 cm lesion which is isoechoic with subcutaneous fat suggesting a lipoma.                                                                      IMPRESSION:  1.  Probable ventral wall lipoma right lower quadrant.      Assessment/Plan: The pt has a  7.6 cm  subcutaneous lesion located on the RLQ of the abdomin.    This lesion is likely either a Lipoma. These lesion is growing in size and/or is painful at times.  With these features I recommend removal of this lesion.     She would like to have  these lesions removed. I discussed this with she. I discussed the risk of bleeding and infection with the patient. We will get this scheduled through our clinic.    Anibal Brock MD ,MD  [phone]  [facility] Department of Surgery

## 2021-08-23 ENCOUNTER — TELEPHONE (OUTPATIENT)
Dept: SURGERY | Facility: CLINIC | Age: 41
End: 2021-08-23

## 2021-10-12 ENCOUNTER — IMMUNIZATION (OUTPATIENT)
Dept: FAMILY MEDICINE | Facility: CLINIC | Age: 41
End: 2021-10-12
Payer: COMMERCIAL

## 2021-10-12 ENCOUNTER — TELEPHONE (OUTPATIENT)
Dept: SURGERY | Facility: CLINIC | Age: 41
End: 2021-10-12

## 2021-10-12 PROCEDURE — 90471 IMMUNIZATION ADMIN: CPT

## 2021-10-12 PROCEDURE — 90686 IIV4 VACC NO PRSV 0.5 ML IM: CPT

## 2021-10-12 NOTE — TELEPHONE ENCOUNTER
Spoke with Breanna  over the phone today regarding surgery scheduling with Dr. Brock at [facility] on  date: 12/16/2021.    Covid Test: patient will schedule with PCP  Post Op: Date: 12/30/2021 at 12:00pm , Location: Telephone    Letter sent via [employer]

## 2021-10-12 NOTE — LETTER
We've received instruction to get you scheduled for surgery with Dr Brock. We have that arranged as follows:     Surgery Date: 12/16/2021  Location: [facility] [address], [address] (3rd floor) [facility]  Arrival Time: 10:00 am (Unless instructed differently by the pre-op call nurse)     Post op Appointment: 12/30/2021 at 12:00pm with Enmanuel Chawla PA-C over the phone.     Prep Instructions:     1. Please schedule a pre-op physical with your primary care doctor. This may be virtual or face-to-face depending on your doctors preference. Call them right away to schedule this.    2. PCR-Rated COVID19 testing is required within 4 days of surgery. You have selected to scheduled this with your primary care clinic. Please reach out if you need assistance in scheduling this test. If your test is positive, your surgery will be canceled.     3. Nothing to eat or drink for 8 hours before surgery unless instructed differently by the pre-op nurse.    4. If the community sees a new COVID19 surge, your procedure may need to be postponed. We will contact you if this happens.     5. You will need an adult to drive you home and stay with you 24 hours after surgery.     6. You may have one family member wait in the lobby at the surgery center during your surgery. Visitor restrictions are subject to change, please verify with the pre-op nurse when they call.    7. When you arrive to the surgery center, you will be screened for COVID19 symptoms. If you screen positive, your surgery will need to be postponed.    8. We always encourage you to notify your insurance any time you have medical tests or procedures scheduled including surgery. The number is usually right on the back of your insurance card. Please call [facility] Cost of Care at [phone] for the surgeon fees, and [facility] Cost of Care [phone] for facility fees if you need pricing information.     9. You will receive a  call from a pre-op call nurse 1-3 days prior to surgery. She will go over more details with you.     Call our office if you have any questions! Thank you!

## 2021-10-13 PROBLEM — D17.30 LIPOMA OF SKIN AND SUBCUTANEOUS TISSUE: Status: ACTIVE | Noted: 2021-10-13

## 2021-11-22 DIAGNOSIS — Z11.59 ENCOUNTER FOR SCREENING FOR OTHER VIRAL DISEASES: ICD-10-CM

## 2021-12-13 ENCOUNTER — LAB (OUTPATIENT)
Dept: LAB | Facility: CLINIC | Age: 41
End: 2021-12-13
Payer: COMMERCIAL

## 2021-12-13 DIAGNOSIS — Z11.59 ENCOUNTER FOR SCREENING FOR OTHER VIRAL DISEASES: ICD-10-CM

## 2021-12-13 PROCEDURE — U0005 INFEC AGEN DETEC AMPLI PROBE: HCPCS

## 2021-12-13 PROCEDURE — U0003 INFECTIOUS AGENT DETECTION BY NUCLEIC ACID (DNA OR RNA); SEVERE ACUTE RESPIRATORY SYNDROME CORONAVIRUS 2 (SARS-COV-2) (CORONAVIRUS DISEASE [COVID-19]), AMPLIFIED PROBE TECHNIQUE, MAKING USE OF HIGH THROUGHPUT TECHNOLOGIES AS DESCRIBED BY CMS-2020-01-R: HCPCS

## 2021-12-14 LAB — SARS-COV-2 RNA RESP QL NAA+PROBE: NEGATIVE

## 2021-12-15 ENCOUNTER — OFFICE VISIT (OUTPATIENT)
Dept: FAMILY MEDICINE | Facility: CLINIC | Age: 41
End: 2021-12-15
Payer: COMMERCIAL

## 2021-12-15 ENCOUNTER — ANESTHESIA EVENT (OUTPATIENT)
Dept: SURGERY | Facility: AMBULATORY SURGERY CENTER | Age: 41
End: 2021-12-15
Payer: COMMERCIAL

## 2021-12-15 VITALS
DIASTOLIC BLOOD PRESSURE: 82 MMHG | WEIGHT: 140.31 LBS | SYSTOLIC BLOOD PRESSURE: 128 MMHG | HEART RATE: 67 BPM | HEIGHT: 64 IN | OXYGEN SATURATION: 98 % | BODY MASS INDEX: 23.95 KG/M2

## 2021-12-15 DIAGNOSIS — D17.30 LIPOMA OF SKIN AND SUBCUTANEOUS TISSUE: ICD-10-CM

## 2021-12-15 DIAGNOSIS — Z01.818 PREOP GENERAL PHYSICAL EXAM: Primary | ICD-10-CM

## 2021-12-15 LAB
HCG UR QL: NEGATIVE
HGB BLD-MCNC: 13.7 G/DL (ref 11.7–15.7)

## 2021-12-15 PROCEDURE — 81025 URINE PREGNANCY TEST: CPT | Performed by: FAMILY MEDICINE

## 2021-12-15 PROCEDURE — 99214 OFFICE O/P EST MOD 30 MIN: CPT | Performed by: FAMILY MEDICINE

## 2021-12-15 PROCEDURE — 36415 COLL VENOUS BLD VENIPUNCTURE: CPT | Performed by: FAMILY MEDICINE

## 2021-12-15 PROCEDURE — 85018 HEMOGLOBIN: CPT | Performed by: FAMILY MEDICINE

## 2021-12-15 ASSESSMENT — MIFFLIN-ST. JEOR: SCORE: 1280.42

## 2021-12-15 NOTE — PROGRESS NOTES
[facility]  [address] [address]  Phone: [phone]  Fax: [phone]  Primary Provider: Sherry Seymour  Pre-op Performing Provider: PAL IRENE      PREOPERATIVE EVALUATION:  Today's date: 12/15/2021    Breanna Patten is a 41 year old female who presents for a preoperative evaluation.    Surgical Information:  Surgery/Procedure: EXCISION, LESION, Likely Lipoma on the Ab Wall.   SUPINE  Surgery Location: [facility]  Surgeon: Anibal Brock MD  Surgery Date: 12/16/21  Time of Surgery: 11 am  Where patient plans to recover: At home with family  Fax number for surgical facility: Note does not need to be faxed, will be available electronically in Epic.    Type of Anesthesia Anticipated: to be determined    Assessment & Plan     The proposed surgical procedure is considered LOW risk.    Preop general physical exam, Lipoma of skin and subcutaneous tissue  - Hemoglobin  - HCG qualitative urine    Risks and Recommendations:  The patient has the following additional risks and recommendations for perioperative complications:   - No identified additional risk factors other than previously addressed    Medication Instructions:  Patient is on no chronic medications    RECOMMENDATION:  APPROVAL GIVEN to proceed with proposed procedure, without further diagnostic evaluation.    Subjective     HPI related to upcoming procedure:     Preop Questions 12/15/2021   1. Have you ever had a heart attack or stroke? No   2. Have you ever had surgery on your heart or blood vessels, such as a stent placement, a coronary artery bypass, or surgery on an artery in your head, neck, heart, or legs? No   3. Do you have chest pain with activity? No   4. Do you have a history of  heart failure? No   5. Do you currently have a cold, bronchitis or symptoms of other infection? No   6. Do you have a cough, shortness of breath, or wheezing? No   7. Do you or  anyone in your family have previous history of blood clots? YES   8. Do you or does anyone in your family have a serious bleeding problem such as prolonged bleeding following surgeries or cuts? No   9. Have you ever had problems with anemia or been told to take iron pills? No   10. Have you had any abnormal blood loss such as black, tarry or bloody stools, or abnormal vaginal bleeding? No   11. Have you ever had a blood transfusion? No   12. Are you willing to have a blood transfusion if it is medically needed before, during, or after your surgery? Yes   13. Have you or any of your relatives ever had problems with anesthesia? No   14. Do you have sleep apnea, excessive snoring or daytime drowsiness? No   15. Do you have any artifical heart valves or other implanted medical devices like a pacemaker, defibrillator, or continuous glucose monitor? No   16. Do you have artificial joints? No   17. Are you allergic to latex? No   18. Is there any chance that you may be pregnant? No     Health Care Directive:  Patient does not have a Health Care Directive or Living Will: Discussed advance care planning with patient; information given to patient to review.    Preoperative Review of :   reviewed - no record of controlled substances prescribed.      Status of Chronic Conditions:  See problem list for active medical problems.  Problems all longstanding and stable, except as noted/documented.  See ROS for pertinent symptoms related to these conditions.      Review of Systems  CONSTITUTIONAL: NEGATIVE for fever, chills, change in weight  INTEGUMENTARY/SKIN: NEGATIVE for worrisome rashes, moles or lesions  EYES: NEGATIVE for vision changes or irritation  ENT/MOUTH: NEGATIVE for ear, mouth and throat problems  RESP: NEGATIVE for significant cough or SOB  CV: NEGATIVE for chest pain, palpitations or peripheral edema  GI: NEGATIVE for nausea, abdominal pain, heartburn, or change in bowel habits  : NEGATIVE for frequency,  "dysuria, or hematuria  MUSCULOSKELETAL: NEGATIVE for significant arthralgias or myalgia  NEURO: NEGATIVE for weakness, dizziness or paresthesias  ENDOCRINE: NEGATIVE for temperature intolerance, skin/hair changes  HEME: NEGATIVE for bleeding problems  PSYCHIATRIC: NEGATIVE for changes in mood or affect    Patient Active Problem List    Diagnosis Date Noted     Lipoma of skin and subcutaneous tissue 10/13/2021     Priority: Medium     Added automatically from request for surgery [id number]       Family history of diabetes mellitus 03/11/2020     Priority: Medium     Mother and brother          No past medical history on file.  Past Surgical History:   Procedure Laterality Date     TONSILLECTOMY       Current Outpatient Medications   Medication Sig Dispense Refill     cholecalciferol (VITAMIN D3) 25 mcg (1000 units) capsule        multivitamin (MULTI-DAY ORAL) [MULTIVITAMIN (MULTI-DAY ORAL)] Take by mouth.         Allergies   Allergen Reactions     Septra [Sulfamethoxazole W/Trimethoprim] Unknown        Social History     Tobacco Use     Smoking status: Never Smoker     Smokeless tobacco: Never Used   Substance Use Topics     Alcohol use: Yes     Family History   Problem Relation Age of Onset     Diabetes Type 2  Mother      Breast Cancer Other         maternal great aunt     Breast Cancer Cousin 40.00     History   Drug Use Unknown         Objective     BP (!) 132/94 (BP Location: Left arm, Patient Position: Sitting)   Pulse 67   Ht 1.632 m (5' 4.25\")   Wt 63.6 kg (140 lb 5 oz)   LMP 12/06/2021   SpO2 98%   BMI 23.90 kg/m      Physical Exam    GENERAL APPEARANCE: healthy, alert and no distress     EYES: EOMI, PERRL        NECK: no adenopathy, no asymmetry, masses, or scars and thyroid normal to palpation     RESP: lungs clear to auscultation - no rales, rhonchi or wheezes     CV: regular rates and rhythm, normal S1 S2, no S3 or S4 and no murmur, click or rub     ABDOMEN:  soft, nontender, no HSM or masses and " bowel sounds normal        SKIN: no suspicious lesions or rashes     NEURO:  mentation intact and speech normal     PSYCH: mentation appears normal. and affect normal/bright     LYMPHATICS: No cervical adenopathy    Recent Labs   Lab Test 06/17/21  1258 03/11/20  1211   HGB 13.8 13.5    139   POTASSIUM 4.4 4.2   CR 0.70 0.65   A1C 5.0 5.0        Diagnostics:  No labs were ordered during this visit.   No EKG required for low risk surgery (cataract, skin procedure, breast biopsy, etc).    Revised Cardiac Risk Index (RCRI):  The patient has the following serious cardiovascular risks for perioperative complications:   - No serious cardiac risks = 0 points     RCRI Interpretation: 0 points: Class I (very low risk - 0.4% complication rate)           Signed Electronically by: Avery Olson MD  Copy of this evaluation report is provided to requesting physician.

## 2021-12-16 ENCOUNTER — ANESTHESIA (OUTPATIENT)
Dept: SURGERY | Facility: AMBULATORY SURGERY CENTER | Age: 41
End: 2021-12-16
Payer: COMMERCIAL

## 2021-12-16 ENCOUNTER — HOSPITAL ENCOUNTER (OUTPATIENT)
Facility: AMBULATORY SURGERY CENTER | Age: 41
End: 2021-12-16
Attending: SURGERY
Payer: COMMERCIAL

## 2021-12-16 VITALS
HEART RATE: 65 BPM | BODY MASS INDEX: 23.94 KG/M2 | OXYGEN SATURATION: 97 % | HEIGHT: 64 IN | RESPIRATION RATE: 16 BRPM | TEMPERATURE: 96.8 F | WEIGHT: 140.21 LBS | SYSTOLIC BLOOD PRESSURE: 106 MMHG | DIASTOLIC BLOOD PRESSURE: 66 MMHG

## 2021-12-16 DIAGNOSIS — D17.30 LIPOMA OF SKIN AND SUBCUTANEOUS TISSUE: ICD-10-CM

## 2021-12-16 PROCEDURE — 22903 EXC ABD LES SC 3 CM/>: CPT | Performed by: SURGERY

## 2021-12-16 RX ORDER — OXYCODONE HYDROCHLORIDE 5 MG/1
5 TABLET ORAL EVERY 4 HOURS PRN
Status: DISCONTINUED | OUTPATIENT
Start: 2021-12-16 | End: 2021-12-17 | Stop reason: HOSPADM

## 2021-12-16 RX ORDER — DEXAMETHASONE SODIUM PHOSPHATE 4 MG/ML
INJECTION, SOLUTION INTRA-ARTICULAR; INTRALESIONAL; INTRAMUSCULAR; INTRAVENOUS; SOFT TISSUE PRN
Status: DISCONTINUED | OUTPATIENT
Start: 2021-12-16 | End: 2021-12-16

## 2021-12-16 RX ORDER — SODIUM CHLORIDE, SODIUM LACTATE, POTASSIUM CHLORIDE, CALCIUM CHLORIDE 600; 310; 30; 20 MG/100ML; MG/100ML; MG/100ML; MG/100ML
INJECTION, SOLUTION INTRAVENOUS CONTINUOUS
Status: DISCONTINUED | OUTPATIENT
Start: 2021-12-16 | End: 2021-12-17 | Stop reason: HOSPADM

## 2021-12-16 RX ORDER — HYDROMORPHONE HCL IN WATER/PF 6 MG/30 ML
0.2 PATIENT CONTROLLED ANALGESIA SYRINGE INTRAVENOUS EVERY 5 MIN PRN
Status: DISCONTINUED | OUTPATIENT
Start: 2021-12-16 | End: 2021-12-17 | Stop reason: HOSPADM

## 2021-12-16 RX ORDER — ONDANSETRON 4 MG/1
4 TABLET, ORALLY DISINTEGRATING ORAL EVERY 30 MIN PRN
Status: DISCONTINUED | OUTPATIENT
Start: 2021-12-16 | End: 2021-12-17 | Stop reason: HOSPADM

## 2021-12-16 RX ORDER — MEPERIDINE HYDROCHLORIDE 25 MG/ML
12.5 INJECTION INTRAMUSCULAR; INTRAVENOUS; SUBCUTANEOUS
Status: DISCONTINUED | OUTPATIENT
Start: 2021-12-16 | End: 2021-12-17 | Stop reason: HOSPADM

## 2021-12-16 RX ORDER — SODIUM CHLORIDE, SODIUM LACTATE, POTASSIUM CHLORIDE, CALCIUM CHLORIDE 600; 310; 30; 20 MG/100ML; MG/100ML; MG/100ML; MG/100ML
INJECTION, SOLUTION INTRAVENOUS CONTINUOUS PRN
Status: DISCONTINUED | OUTPATIENT
Start: 2021-12-16 | End: 2021-12-16

## 2021-12-16 RX ORDER — FENTANYL CITRATE 50 UG/ML
INJECTION, SOLUTION INTRAMUSCULAR; INTRAVENOUS PRN
Status: DISCONTINUED | OUTPATIENT
Start: 2021-12-16 | End: 2021-12-16

## 2021-12-16 RX ORDER — ONDANSETRON 2 MG/ML
INJECTION INTRAMUSCULAR; INTRAVENOUS PRN
Status: DISCONTINUED | OUTPATIENT
Start: 2021-12-16 | End: 2021-12-16

## 2021-12-16 RX ORDER — FENTANYL CITRATE 50 UG/ML
25 INJECTION, SOLUTION INTRAMUSCULAR; INTRAVENOUS EVERY 5 MIN PRN
Status: DISCONTINUED | OUTPATIENT
Start: 2021-12-16 | End: 2021-12-17 | Stop reason: HOSPADM

## 2021-12-16 RX ORDER — HYDROCODONE BITARTRATE AND ACETAMINOPHEN 5; 325 MG/1; MG/1
1-2 TABLET ORAL EVERY 4 HOURS PRN
Qty: 6 TABLET | Refills: 0 | Status: SHIPPED | OUTPATIENT
Start: 2021-12-16 | End: 2022-07-14

## 2021-12-16 RX ORDER — CEFAZOLIN SODIUM 2 G/100ML
2 INJECTION, SOLUTION INTRAVENOUS SEE ADMIN INSTRUCTIONS
Status: DISCONTINUED | OUTPATIENT
Start: 2021-12-16 | End: 2021-12-17 | Stop reason: HOSPADM

## 2021-12-16 RX ORDER — ONDANSETRON 2 MG/ML
4 INJECTION INTRAMUSCULAR; INTRAVENOUS EVERY 30 MIN PRN
Status: DISCONTINUED | OUTPATIENT
Start: 2021-12-16 | End: 2021-12-17 | Stop reason: HOSPADM

## 2021-12-16 RX ORDER — LIDOCAINE HYDROCHLORIDE 20 MG/ML
INJECTION, SOLUTION INFILTRATION; PERINEURAL PRN
Status: DISCONTINUED | OUTPATIENT
Start: 2021-12-16 | End: 2021-12-16

## 2021-12-16 RX ORDER — LIDOCAINE 40 MG/G
CREAM TOPICAL
Status: DISCONTINUED | OUTPATIENT
Start: 2021-12-16 | End: 2021-12-17 | Stop reason: HOSPADM

## 2021-12-16 RX ORDER — GLYCOPYRROLATE 0.2 MG/ML
INJECTION, SOLUTION INTRAMUSCULAR; INTRAVENOUS PRN
Status: DISCONTINUED | OUTPATIENT
Start: 2021-12-16 | End: 2021-12-16

## 2021-12-16 RX ORDER — FENTANYL CITRATE 50 UG/ML
25 INJECTION, SOLUTION INTRAMUSCULAR; INTRAVENOUS
Status: DISCONTINUED | OUTPATIENT
Start: 2021-12-16 | End: 2021-12-17 | Stop reason: HOSPADM

## 2021-12-16 RX ORDER — BUPIVACAINE HYDROCHLORIDE AND EPINEPHRINE 2.5; 5 MG/ML; UG/ML
INJECTION, SOLUTION INFILTRATION; PERINEURAL PRN
Status: DISCONTINUED | OUTPATIENT
Start: 2021-12-16 | End: 2021-12-16 | Stop reason: HOSPADM

## 2021-12-16 RX ORDER — PROPOFOL 10 MG/ML
INJECTION, EMULSION INTRAVENOUS PRN
Status: DISCONTINUED | OUTPATIENT
Start: 2021-12-16 | End: 2021-12-16

## 2021-12-16 RX ORDER — KETOROLAC TROMETHAMINE 15 MG/ML
INJECTION, SOLUTION INTRAMUSCULAR; INTRAVENOUS PRN
Status: DISCONTINUED | OUTPATIENT
Start: 2021-12-16 | End: 2021-12-16

## 2021-12-16 RX ORDER — PROPOFOL 10 MG/ML
INJECTION, EMULSION INTRAVENOUS CONTINUOUS PRN
Status: DISCONTINUED | OUTPATIENT
Start: 2021-12-16 | End: 2021-12-16

## 2021-12-16 RX ORDER — CEFAZOLIN SODIUM 2 G/100ML
2 INJECTION, SOLUTION INTRAVENOUS
Status: COMPLETED | OUTPATIENT
Start: 2021-12-16 | End: 2021-12-16

## 2021-12-16 RX ADMIN — LIDOCAINE HYDROCHLORIDE 3 ML: 20 INJECTION, SOLUTION INFILTRATION; PERINEURAL at 11:08

## 2021-12-16 RX ADMIN — GLYCOPYRROLATE 0.2 MG: 0.2 INJECTION, SOLUTION INTRAMUSCULAR; INTRAVENOUS at 11:07

## 2021-12-16 RX ADMIN — SODIUM CHLORIDE, SODIUM LACTATE, POTASSIUM CHLORIDE, CALCIUM CHLORIDE: 600; 310; 30; 20 INJECTION, SOLUTION INTRAVENOUS at 10:52

## 2021-12-16 RX ADMIN — FENTANYL CITRATE 25 MCG: 50 INJECTION, SOLUTION INTRAMUSCULAR; INTRAVENOUS at 11:16

## 2021-12-16 RX ADMIN — FENTANYL CITRATE 25 MCG: 50 INJECTION, SOLUTION INTRAMUSCULAR; INTRAVENOUS at 11:19

## 2021-12-16 RX ADMIN — PROPOFOL 120 MCG/KG/MIN: 10 INJECTION, EMULSION INTRAVENOUS at 11:08

## 2021-12-16 RX ADMIN — CEFAZOLIN SODIUM 2 G: 2 INJECTION, SOLUTION INTRAVENOUS at 11:09

## 2021-12-16 RX ADMIN — PROPOFOL 50 MG: 10 INJECTION, EMULSION INTRAVENOUS at 11:10

## 2021-12-16 RX ADMIN — ONDANSETRON 4 MG: 2 INJECTION INTRAMUSCULAR; INTRAVENOUS at 11:07

## 2021-12-16 RX ADMIN — DEXAMETHASONE SODIUM PHOSPHATE 4 MG: 4 INJECTION, SOLUTION INTRA-ARTICULAR; INTRALESIONAL; INTRAMUSCULAR; INTRAVENOUS; SOFT TISSUE at 11:15

## 2021-12-16 RX ADMIN — FENTANYL CITRATE 50 MCG: 50 INJECTION, SOLUTION INTRAMUSCULAR; INTRAVENOUS at 11:10

## 2021-12-16 RX ADMIN — KETOROLAC TROMETHAMINE 15 MG: 15 INJECTION, SOLUTION INTRAMUSCULAR; INTRAVENOUS at 11:36

## 2021-12-16 ASSESSMENT — MIFFLIN-ST. JEOR: SCORE: 1279.97

## 2021-12-16 NOTE — ANESTHESIA CARE TRANSFER NOTE
Patient: Breanna Patten    Procedure: Procedure(s):  EXCISION, LESION, Likely Lipoma on the Ab Wall.   SUPINE       Diagnosis: Lipoma of skin and subcutaneous tissue [D17.30]  Diagnosis Additional Information: No value filed.    Anesthesia Type:   MAC     Note:    Oropharynx: oropharynx clear of all foreign objects  Level of Consciousness: awake  Oxygen Supplementation: room air    Independent Airway: airway patency satisfactory and stable    Vital Signs Stable: post-procedure vital signs reviewed and stable  Report to RN Given: handoff report given  Patient transferred to: Phase II    Handoff Report: Identifed the Patient, Identified the Reponsible Provider, Reviewed the pertinent medical history, Discussed the surgical course, Reviewed Intra-OP anesthesia mangement and issues during anesthesia, Set expectations for post-procedure period and Allowed opportunity for questions and acknowledgement of understanding      Vitals:  Vitals Value Taken Time   /61 12/16/21 1151   Temp 96.8  F (36  C) 12/16/21 1151   Pulse 97 12/16/21 1150   Resp 16 12/16/21 1151   SpO2 90 % 12/16/21 1151   Vitals shown include unvalidated device data.    Electronically Signed By: RYAN Nicholas CRNA  December 16, 2021  11:52 AM

## 2021-12-16 NOTE — DISCHARGE INSTRUCTIONS
You received a medication called Toradol (a stronger IV ibuprofen) at 11:36 AM. Do NOT take any Ibuprofen / Advil / Motrin / Aleve / Naproxen or products containing Ibuprofen until 5:36 PM or later.    If you have any questions or concerns regarding your procedure, please contact Dr. Brock, his office number is [phone].    Take tylenol and ibuprofen every 6 hours as your main form of pain control.     Acetaminophen (Tylenol): Next Dose: As needed. Take 650-1000 mg every 6 hours for mild to moderate pain.    Do not exceed 4,000 mg of acetaminophen during a 24 hour period and keep in mind that acetaminophen can also be found in many over-the-counter cold medications as well as narcotics that may be given for pain.     Hydrocodone-tylenol: Next Dose: As needed. Take 1-2 tab(s) every 4 hours for moderate to severe pain not controlled with tylenol and ibuprofen.    Senna-Docusate (Stool Softener): Next dose: When you get home. Take as instructed on the bottle. This is an over-the-counter medication. Take this while taking narcotic medications to prevent constipation.      Discharge Instructions: After Your Surgery    You ve just had surgery. During surgery, you were given medicine called anesthesia to keep you relaxed and free of pain. After surgery, you may have some pain or nausea. This is common. Here are some tips for feeling better and getting well after surgery.    Going home    Your healthcare provider will show you how to take care of yourself when you go home. He or she will also answer your questions. Have an adult family member or friend drive you home. For the first 24 hours after your surgery:    Don't drive or use heavy equipment.    Don't make important decisions or sign legal papers.    Don't drink alcohol.    Have someone stay with you, if needed. He or she can watch for problems and help keep you safe.  Be sure to go to all follow-up visits with your healthcare provider. And rest after your  surgery for as long as your healthcare provider tells you to.    Coping with pain    If you have pain after surgery, pain medicine will help you feel better. Take it as told, before pain becomes severe. Also, ask your healthcare provider or pharmacist about other ways to control pain. This might be with heat, ice, or relaxation. And follow any other instructions your surgeon or nurse gives you.    Tips for taking pain medicine    To get the best relief possible, remember these points:    Pain medicines can upset your stomach. Taking them with a little food may help.    Most pain relievers taken by mouth need at least 20 to 30 minutes to start to work.    Don't wait till your pain becomes severe before you take your medicine. Try to time your medicine so that you can take it before starting an activity. This might be before you get dressed, go for a walk, or sit down for dinner.    Constipation is a common side effect of pain medicines. It's ok to take medicines such as laxatives or stool softeners to help ease constipation. Also ask if you should skip any foods. Drinking lots of fluids and eating foods such as fruits and vegetables that are high in fiber can also help.    Drinking alcohol and taking pain medicine can cause dizziness and slow your breathing. It can even be deadly. Don't drink alcohol while taking pain medicine.    Pain medicine can make you react more slowly to things. Don't drive or run machinery while taking pain medicine.  Your healthcare provider may tell you to take acetaminophen to help ease your pain. Ask him or her how much you are supposed to take each day. Acetaminophen or other pain relievers may interact with your prescription medicines or other over-the-counter (OTC) medicines. Some prescription medicines have acetaminophen and other ingredients. Using both prescription and OTC acetaminophen for pain can cause you to overdose. Read the labels on your OTC medicines with care. This will  help you to clearly know the list of ingredients, how much to take, and any warnings. It may also help you not take too much acetaminophen. If you have questions or don't understand the information, ask your pharmacist or healthcare provider to explain it to you before you take the OTC medicine.    Managing nausea    Some people have an upset stomach after surgery. This is often because of anesthesia, pain, or pain medicine, or the stress of surgery. These tips will help you handle nausea and eat healthy foods as you get better. If you were on a special food plan before surgery, ask your healthcare provider if you should follow it while you get better. These tips may help:      Don't push yourself to eat. Your body will tell you when to eat and how much.    Start off with clear liquids and soup. They are easier to digest.    Next try semi-solid foods, such as mashed potatoes, applesauce, and gelatin, as you feel ready.    Slowly move to solid foods. Don t eat fatty, rich, or spicy foods at first.    Don't force yourself to have 3 large meals a day. Instead eat smaller amounts more often.    Take pain medicines with a small amount of solid food, such as crackers or toast, to prevent nausea.    When to call your healthcare provider    Call your healthcare provider if:    You still have intolerable pain an hour after taking medicine. The medicine may not be strong enough.    You feel too sleepy, dizzy, or groggy. The medicine may be too strong.    You have side effects such as nausea or vomiting, or skin changes such as rash, itching, or hives. Your healthcare provider may suggest other medicines to control side effects.  Rash, itching, or hives may mean you have an allergic reaction. Report this right away. If you have trouble breathing or facial swelling, call 911 right away.

## 2021-12-16 NOTE — H&P
HISTORY AND PHYSICAL UPDATE:    I have assessed the patient and evaluated the chart and and verify the patient's clinical status has not changed since our last documentation.  The patient is ready to move forward with the planned surgery.  Lungs: Clear to auscultation  Heart: Regular rate and rhythm    HPI: Pt is here with concerns about a subcutaneous mass located RLQ of her abdomin.  It has been present for the last month, since she first noticed it..   This lesion is not tender.  The lesion has not drained.       Allergies:Septra [sulfamethoxazole w/trimethoprim]     Past Medical History   No past medical history on file.        Past Surgical History   No past surgical history on file.        REVIEW OF SYSTEMS:  10 point ROS is negative except for; as mentioned above.     CURRENT MEDS:     Current Outpatient Medications:      multivitamin (MULTI-DAY ORAL), [MULTIVITAMIN (MULTI-DAY ORAL)] Take by mouth., Disp: , Rfl:      There were no vitals taken for this visit.  There is no height or weight on file to calculate BMI.     EXAM:  GENERAL:Well developed she appears her stated age  HEAD & NECK: Extraocular motions intact, anicteric sclera,  ABDOMEN: Soft and nondistended, positive bowel sounds  LUNGS:  CTA  HEART:  RRR  EXTREMITIES: Full mobility,   INTEGUMENT: The patient has a subcutaneous lesion located RLQ of the ab.   The lesion is 7 cm cm wide.     EXAM: US ABDOMEN LIMITED  LOCATION: [facility]  DATE/TIME: 7/28/2021 9:37 AM     INDICATION: Right lower quadrant lump.  COMPARISON: None.  TECHNIQUE: Limited abdominal ultrasound.     FINDINGS:     Scans of the right lower quadrant ventral wall were performed without with Valsalva. No evidence of fascial defects nor hernia. 7.6 x 6.9 x 1.4 cm lesion which is isoechoic with subcutaneous fat suggesting a lipoma.                                                                      IMPRESSION:  1.  Probable ventral wall lipoma right lower  quadrant.        Assessment/Plan: The pt has a  7.6 cm  subcutaneous lesion located on the RLQ of the abdomin.    This lesion is likely either a Lipoma. These lesion is growing in size and/or is painful at times.  With these features I recommend removal of this lesion.      She would like to have these lesions removed. I discussed this with she. I discussed the risk of bleeding and infection with the patient. We will get this scheduled through our clinic.      Anibal Brock  [facility]; surgeons  [phone]

## 2021-12-16 NOTE — PROGRESS NOTES
Offered patient pregnancy test.  Explained there are risks associated with anesthesia and pregnancy.  Patient verbalizes understanding, denies possibility of pregnancy and declines pregnancy test.

## 2021-12-16 NOTE — ANESTHESIA POSTPROCEDURE EVALUATION
Patient: Breanna Patten    Procedure: Procedure(s):  EXCISION, LESION, Likely Lipoma on the Ab Wall.   SUPINE       Diagnosis:Lipoma of skin and subcutaneous tissue [D17.30]  Diagnosis Additional Information: No value filed.    Anesthesia Type:  MAC    Note:  Disposition: Outpatient   Postop Pain Control: Uneventful            Sign Out: Well controlled pain   PONV: No   Neuro/Psych: Uneventful            Sign Out: Acceptable/Baseline neuro status   Airway/Respiratory: Uneventful            Sign Out: Acceptable/Baseline resp. status   CV/Hemodynamics: Uneventful            Sign Out: Acceptable CV status; No obvious hypovolemia; No obvious fluid overload   Other NRE: NONE   DID A NON-ROUTINE EVENT OCCUR? No           Last vitals:  Vitals Value Taken Time   /65 12/16/21 1350   Temp 96.8  F (36  C) 12/16/21 1151   Pulse 76 12/16/21 1351   Resp 16 12/16/21 1230   SpO2 94 % 12/16/21 1351   Vitals shown include unvalidated device data.    Electronically Signed By: Elida Laboy MD  December 16, 2021  3:01 PM

## 2021-12-16 NOTE — ANESTHESIA PREPROCEDURE EVALUATION
Anesthesia Pre-Procedure Evaluation    Patient: Breanna Patten   MRN: [id number] : 1980        Preoperative Diagnosis: Lipoma of skin and subcutaneous tissue [D17.30]    Procedure : Procedure(s):  EXCISION, LESION, Likely Lipoma on the Ab Wall.   SUPINE          History reviewed. No pertinent past medical history.   Past Surgical History:   Procedure Laterality Date     TONSILLECTOMY        Allergies   Allergen Reactions     Septra [Sulfamethoxazole W/Trimethoprim] Unknown      Social History     Tobacco Use     Smoking status: Never Smoker     Smokeless tobacco: Never Used   Substance Use Topics     Alcohol use: Yes      Wt Readings from Last 1 Encounters:   21 63.6 kg (140 lb 3.4 oz)        Anesthesia Evaluation   Pt has had prior anesthetic.     No history of anesthetic complications       ROS/MED HX  ENT/Pulmonary:       Neurologic:       Cardiovascular:       METS/Exercise Tolerance:     Hematologic:       Musculoskeletal:       GI/Hepatic:       Renal/Genitourinary:       Endo:       Psychiatric/Substance Use:       Infectious Disease:       Malignancy:       Other:            Physical Exam    Airway        Mallampati: II    Neck ROM: full     Respiratory Devices and Support         Dental  no notable dental history         Cardiovascular   cardiovascular exam normal          Pulmonary   pulmonary exam normal                OUTSIDE LABS:  CBC:   Lab Results   Component Value Date    HGB 13.7 12/15/2021    HGB 13.8 2021     BMP:   Lab Results   Component Value Date     2021     2020    POTASSIUM 4.4 2021    POTASSIUM 4.2 2020    CHLORIDE 105 2021    CHLORIDE 105 2020    CO2 23 2021    CO2 25 2020    BUN 7 (L) 2021    BUN 7 (L) 2020    CR 0.70 2021    CR 0.65 2020    GLC 88 2021    GLC 88 2020     COAGS: No results found for: PTT, INR, FIBR  POC:   Lab Results   Component Value Date    HCG  Negative 12/15/2021     HEPATIC:   Lab Results   Component Value Date    ALBUMIN 4.1 03/11/2020    PROTTOTAL 6.8 03/11/2020    ALT 25 03/11/2020    AST 21 03/11/2020    ALKPHOS 67 03/11/2020    BILITOTAL 0.6 03/11/2020     OTHER:   Lab Results   Component Value Date    A1C 5.0 06/17/2021    NATE 9.2 06/17/2021       Anesthesia Plan    ASA Status:  1      Anesthesia Type: MAC.              Consents    Anesthesia Plan(s) and associated risks, benefits, and realistic alternatives discussed. Questions answered and patient/representative(s) expressed understanding.     - Discussed: Risks, Benefits and Alternatives for the PROCEDURE were discussed     - Discussed with:  Patient         Postoperative Care    Pain management: Multi-modal analgesia.        Comments:                Elida Laboy MD

## 2021-12-16 NOTE — OP NOTE
Operative Note    Name:  Breanna Patten  Location: [facility]  Procedure Date:  12/16/2021  PCP:  Sherry Seymour    Surgery Performed:  Procedure/Surgery Information   Procedure: Procedure(s):  EXCISION, LESION, Likely Lipoma on the Ab Wall.   SUPINE   Surgeon(s): Surgeon(s) and Role:     * Anibal Brock MD - Primary   Specimens: ID Type Source Tests Collected by Time Destination   1 : abdominal wall lipoma  Tissue Abdomen SURGICAL PATHOLOGY EXAM Anibal Brock MD 12/16/2021 11:34 AM                Pre-Procedure Diagnosis:  Lipoma of skin and subcutaneous tissue [D17.30]    Post-Procedure Diagnosis:    Lipoma of skin and subcutaneous tissue [D17.30]    Anesthesia:  Monitor Anesthesia Care     History reviewed. No pertinent past medical history.    Patient Active Problem List    Diagnosis Date Noted     Lipoma of skin and subcutaneous tissue 10/13/2021     Priority: Medium     Added automatically from request for surgery [id number]       Family history of diabetes mellitus 03/11/2020     Priority: Medium     Mother and brother           Findings:  A lipomatous mass was excised from the right lower quadrant of the subcutaneous space measures 10 x 14 cm x 3 cm.    Operative Report:    Patient brought the operating room placed in the supine position given MAC anesthesia sterilely prepped and draped in the usual surgical fashion a timeout process is undertaken.    A 3 cm incision was made over the lesion of concern subcutaneous tissues are dissected through electrocautery there was a vessel encountered which was tied off with 3-0 Vicryl ties the lipomatous mass was encountered it was dissected around bluntly and then peeled off away from the area as much it was attached with electrocautery it was peeled right off of the external oblique fascia.  This mass was fatty and measures 10 x 14 cm.  It is approximately 3 cm deep.  A sizable mass it was removed en bloc and the operative field was evaluated  for hemostasis.  Hemostasis was achieved with electrocautery the subcutaneous tissues were drawn together with 3-0 Vicryl sutures the skin was closed with a running 4-0 subcuticular Monocryl stitch.  The operative field was infused with local anesthetic.  The wound was cleaned and covered with Telfa and Tegaderm for dressing.  The patient was woken up taken to recovery    Estimated Blood Loss:   5 cc       Drains:        Implants:  * No implants in log *    Complications:    None    Anibal Brock MD     Date: 12/16/2021  Time: 1:07 PM

## 2021-12-30 ENCOUNTER — VIRTUAL VISIT (OUTPATIENT)
Dept: SURGERY | Facility: CLINIC | Age: 41
End: 2021-12-30
Payer: COMMERCIAL

## 2021-12-30 DIAGNOSIS — D17.30 LIPOMA OF SKIN AND SUBCUTANEOUS TISSUE: Primary | ICD-10-CM

## 2021-12-30 PROCEDURE — 99024 POSTOP FOLLOW-UP VISIT: CPT | Performed by: PHYSICIAN ASSISTANT

## 2021-12-30 NOTE — LETTER
12/30/2021         RE: Breanna Patten  [address] [place] [address]        Dear Colleague,    Thank you for referring your patient, Breanna Patten, to the [facility] [place]. Please see a copy of my visit note below.    Telemedicine Visit: The patient's condition can be safely assessed and treated via telephone telemedicine encounter.      Reason for Telemedicine Visit: Patient has requested telehealth visit    Originating Site (Patient Location): Patient's home    Distant Site (Provider Location): [facility]    Consent:  The patient/guardian has verbally consented to: the potential risks and benefits of telemedicine (video visit) versus in person care; bill my insurance or make self-payment for services provided; and responsibility for payment of non-covered services.     Mode of Communication:  telephone    As the provider I attest to compliance with applicable laws and regulations related to telemedicine.       HPI: Pt is s/p lipoma excison with Dr. Brock on 12/16/21.   she is doing well.  Pain is well controlled:  Yes. No difficulties with the surgical wound/wounds, no erythema or drainage.  she is eating well and denies fever and chills. Bowel function has returned to normal.    Pathology results reviewed with her.      Assessment/Plan: Doing well after surgery and should follow up as needed.    Enmanuel Chawla PA-C  [phone]  General Surgery          Again, thank you for allowing me to participate in the care of your patient.        Sincerely,        Enmanuel Chawla PA-C

## 2021-12-30 NOTE — PROGRESS NOTES
Telemedicine Visit: The patient's condition can be safely assessed and treated via telephone telemedicine encounter.      Reason for Telemedicine Visit: Patient has requested telehealth visit    Originating Site (Patient Location): Patient's home    Distant Site (Provider Location): [facility]    Consent:  The patient/guardian has verbally consented to: the potential risks and benefits of telemedicine (video visit) versus in person care; bill my insurance or make self-payment for services provided; and responsibility for payment of non-covered services.     Mode of Communication:  telephone    As the provider I attest to compliance with applicable laws and regulations related to telemedicine.       HPI: Pt is s/p lipoma excison with Dr. Brock on 12/16/21.   she is doing well.  Pain is well controlled:  Yes. No difficulties with the surgical wound/wounds, no erythema or drainage.  she is eating well and denies fever and chills. Bowel function has returned to normal.    Pathology results reviewed with her.      Assessment/Plan: Doing well after surgery and should follow up as needed.    Enmanuel Chawla PA-C  [phone]  General Surgery

## 2022-07-14 ENCOUNTER — OFFICE VISIT (OUTPATIENT)
Dept: OBGYN | Facility: CLINIC | Age: 42
End: 2022-07-14
Payer: COMMERCIAL

## 2022-07-14 VITALS
BODY MASS INDEX: 24.24 KG/M2 | OXYGEN SATURATION: 97 % | SYSTOLIC BLOOD PRESSURE: 122 MMHG | DIASTOLIC BLOOD PRESSURE: 83 MMHG | WEIGHT: 142 LBS | HEART RATE: 71 BPM | HEIGHT: 64 IN

## 2022-07-14 DIAGNOSIS — E55.9 VITAMIN D DEFICIENCY: ICD-10-CM

## 2022-07-14 DIAGNOSIS — Z01.419 ENCOUNTER FOR GYNECOLOGICAL EXAMINATION WITHOUT ABNORMAL FINDING: Primary | ICD-10-CM

## 2022-07-14 DIAGNOSIS — Z12.4 SCREENING FOR MALIGNANT NEOPLASM OF CERVIX: ICD-10-CM

## 2022-07-14 DIAGNOSIS — Z13.1 SCREENING FOR DIABETES MELLITUS: ICD-10-CM

## 2022-07-14 DIAGNOSIS — Z13.220 LIPID SCREENING: ICD-10-CM

## 2022-07-14 PROCEDURE — G0145 SCR C/V CYTO,THINLAYER,RESCR: HCPCS | Performed by: OBSTETRICS & GYNECOLOGY

## 2022-07-14 PROCEDURE — 87624 HPV HI-RISK TYP POOLED RSLT: CPT | Performed by: OBSTETRICS & GYNECOLOGY

## 2022-07-14 PROCEDURE — 99386 PREV VISIT NEW AGE 40-64: CPT | Performed by: OBSTETRICS & GYNECOLOGY

## 2022-07-14 ASSESSMENT — PATIENT HEALTH QUESTIONNAIRE - PHQ9
SUM OF ALL RESPONSES TO PHQ QUESTIONS 1-9: 2
5. POOR APPETITE OR OVEREATING: SEVERAL DAYS

## 2022-07-14 ASSESSMENT — ANXIETY QUESTIONNAIRES
5. BEING SO RESTLESS THAT IT IS HARD TO SIT STILL: SEVERAL DAYS
GAD7 TOTAL SCORE: 3
1. FEELING NERVOUS, ANXIOUS, OR ON EDGE: NOT AT ALL
GAD7 TOTAL SCORE: 3
2. NOT BEING ABLE TO STOP OR CONTROL WORRYING: NOT AT ALL
7. FEELING AFRAID AS IF SOMETHING AWFUL MIGHT HAPPEN: NOT AT ALL
3. WORRYING TOO MUCH ABOUT DIFFERENT THINGS: NOT AT ALL
6. BECOMING EASILY ANNOYED OR IRRITABLE: SEVERAL DAYS

## 2022-07-14 NOTE — PROGRESS NOTES
GYN CLINIC VISIT  2022  CC: annual exam    HPI:  Breanna is a 41 year old nulligravid female who presents for annual exam.     Menses are regular q 28-30 days and 5 d of bleeding. Menses flow: normal.  Patient's last menstrual period was 2022.. No period concerns. Using none for contraception.  She is not currently considering pregnancy.   Besides routine health maintenance, she has no other health concerns today .  GYNECOLOGIC HISTORY:    Breanna is sexually active with 1male partner(s) and is currently in monogamous relationship.  Rare intercourse  History sexually transmitted infections: No STD history  STI testing offered?  Declined  KATIA exposure: Unknown  History of abnormal Pap smear: NO - age 30-65 PAP every 5 years with negative HPV co-testing recommended  Family history of breast CA: Yes (Please explain): cousin, m-great aunt  Family history of uterine/ovarian CA: No    Family history of colon CA: No    HEALTH MAINTENANCE:  Cholesterol: (  Cholesterol   Date Value Ref Range Status   2021 226 (H) <=199 mg/dL Final   2020 216 (H) <=199 mg/dL Final    History of abnormal lipids: Yes  Mammo: na . History of abnormal Mammo: No.  Regular Self Breast Exams: Yes  Calcium/Vitamin D intake: source:  Vit d Adequate? Yes  TSH: (No results found for: TSH )  Pap; (No results found for: PAP )    PHQ 2022   PHQ-9 Total Score 2   Q9: Thoughts of better off dead/self-harm past 2 weeks Not at all     FOUZIA-7 SCORE 2022   Total Score 3         HISTORY:  OB History    Para Term  AB Living   0 0 0 0 0 0   SAB IAB Ectopic Multiple Live Births   0 0 0 0 0     No past medical history on file.     Past Surgical History:   Procedure Laterality Date     EXCISE LESION TRUNK N/A 2021    Procedure: EXCISION, LESION, Likely Lipoma on the Ab Wall.   SUPINE;  Surgeon: Anibal Brock MD;  Location: [facility]     TONSILLECTOMY       Family History   Problem Relation Age of  "Onset     Diabetes Type 2  Mother      Breast Cancer Cousin 40     Breast Cancer Other         maternal great aunt     Social History   .  Lives with , feels safe  No tobacco use  No reg exercise but likes to hike  Socioeconomic History     Marital status:    Tobacco Use     Smoking status: Never Smoker     Smokeless tobacco: Never Used   Substance and Sexual Activity     Alcohol use: Yes     Drug use: Never       Current Outpatient Medications:      cholecalciferol (VITAMIN D3) 25 mcg (1000 units) capsule, , Disp: , Rfl:      multivitamin (MULTI-DAY ORAL), [MULTIVITAMIN (MULTI-DAY ORAL)] Take by mouth., Disp: , Rfl:      Allergies   Allergen Reactions     Septra [Sulfamethoxazole W/Trimethoprim] Unknown     As a baby, unknown reaction       Past medical, surgical, social and family history were reviewed and updated in EPIC.    ROS:   C:     NEGATIVE for fever, chills, change in weight  I:       NEGATIVE for worrisome rashes, moles or lesions  E:     NEGATIVE for vision changes or irritation  E/M: NEGATIVE for ear, mouth and throat problems  R:     NEGATIVE for significant cough or SOB  CV:   NEGATIVE for chest pain, palpitations or peripheral edema  GI:     NEGATIVE for nausea, abdominal pain, heartburn, or change in bowel habits  :   NEGATIVE for frequency, dysuria, hematuria, vaginal discharge, or irregular bleeding  M:     NEGATIVE for significant arthralgias or myalgia  N:      NEGATIVE for weakness, dizziness or paresthesias  E:      NEGATIVE for temperature intolerance, skin/hair changes  P:      NEGATIVE for changes in mood or affect.    EXAM:  /83   Pulse 71   Ht 1.632 m (5' 4.25\")   Wt 64.4 kg (142 lb)   LMP 06/28/2022   SpO2 97%   BMI 24.18 kg/m     BMI: Body mass index is 24.18 kg/m .  Constitutional: healthy, alert and no distress  Head: Normocephalic. No masses, lesions, tenderness or abnormalities  Neck: Neck supple. Trachea midline. No adenopathy. " Thyroid symmetric, normal size.   Cardiovascular: RRR.   Respiratory: clear bilaterally.   Breast: No nodularity, asymmetry or nipple discharge bilaterally.  Gastrointestinal: Abdomen soft, non-tender, non-distended. No masses, organomegaly.  :  Vulva:  No external lesions, normal female hair distribution, no inguinal adenopathy.    Urethra:  Midline, non-tender, well supported, no discharge  Vagina:  Moist, pink, no abnormal discharge, no lesions  Uterus:  Normal size, ante , non-tender, freely mobile  Ovaries:  No masses appreciated, non-tender, mobile  Rectal Exam: deferred  Musculoskeletal: extremities normal  Skin: no suspicious lesions or rashes  Psychiatric: Affect appropriate, cooperative,mentation appears normal.     COUNSELING:   Reviewed preventive health counseling, as reflected in patient instructions       Regular exercise       Healthy diet/nutrition       Contraception   reports that she has never smoked. She has never used smokeless tobacco.    Body mass index is 24.18 kg/m .    FRAX Risk Assessment    ASSESSMENT:  41 year old female with satisfactory annual exam    1. Encounter for gynecological examination without abnormal finding  Already has mammo scheduled    2. Screening for malignant neoplasm of cervix  Cervical Cancer Screening: A pap smear has been collected today, will perform cytology and HPV testing as patient is >30 years old.  If negative cytology and negative high risk HPV, plan to screen with co-testing every 5 years per ASCCP guidelines.  The patient will be notified by phone if there are any abnormal results and follow up arranged in accordance with ASCCP guidelines.    - Pap screen with HPV - recommended age 30 - 65 years    3. Lipid screening    - Lipid panel reflex to direct LDL Fasting; Future    4. Vitamin D deficiency    - Vitamin D Deficiency; Future    5. Screening for diabetes mellitus    - Hemoglobin A1c (aka HBA1C); Future    Cyndie Burris MD

## 2022-07-18 ENCOUNTER — ANCILLARY PROCEDURE (OUTPATIENT)
Dept: MAMMOGRAPHY | Facility: CLINIC | Age: 42
End: 2022-07-18
Attending: NURSE PRACTITIONER
Payer: COMMERCIAL

## 2022-07-18 DIAGNOSIS — Z12.31 VISIT FOR SCREENING MAMMOGRAM: ICD-10-CM

## 2022-07-18 PROCEDURE — 77067 SCR MAMMO BI INCL CAD: CPT

## 2022-07-19 LAB
BKR LAB AP GYN ADEQUACY: NORMAL
BKR LAB AP GYN INTERPRETATION: NORMAL
BKR LAB AP HPV REFLEX: NORMAL
BKR LAB AP PREVIOUS ABNORMAL: NORMAL
PATH REPORT.COMMENTS IMP SPEC: NORMAL
PATH REPORT.COMMENTS IMP SPEC: NORMAL
PATH REPORT.RELEVANT HX SPEC: NORMAL

## 2022-07-21 LAB
HUMAN PAPILLOMA VIRUS 16 DNA: NEGATIVE
HUMAN PAPILLOMA VIRUS 18 DNA: NEGATIVE
HUMAN PAPILLOMA VIRUS FINAL DIAGNOSIS: NORMAL
HUMAN PAPILLOMA VIRUS OTHER HR: NEGATIVE

## 2022-07-29 ENCOUNTER — LAB (OUTPATIENT)
Dept: LAB | Facility: CLINIC | Age: 42
End: 2022-07-29
Payer: COMMERCIAL

## 2022-07-29 DIAGNOSIS — Z13.220 LIPID SCREENING: ICD-10-CM

## 2022-07-29 DIAGNOSIS — Z13.1 SCREENING FOR DIABETES MELLITUS: ICD-10-CM

## 2022-07-29 DIAGNOSIS — E55.9 VITAMIN D DEFICIENCY: ICD-10-CM

## 2022-07-29 LAB
CHOLEST SERPL-MCNC: 254 MG/DL
DEPRECATED CALCIDIOL+CALCIFEROL SERPL-MC: 34 UG/L (ref 20–75)
HBA1C MFR BLD: 5.1 % (ref 0–5.6)
HDLC SERPL-MCNC: 31 MG/DL
LDLC SERPL CALC-MCNC: 154 MG/DL
NONHDLC SERPL-MCNC: 223 MG/DL
TRIGL SERPL-MCNC: 343 MG/DL

## 2022-07-29 PROCEDURE — 83036 HEMOGLOBIN GLYCOSYLATED A1C: CPT

## 2022-07-29 PROCEDURE — 36415 COLL VENOUS BLD VENIPUNCTURE: CPT

## 2022-07-29 PROCEDURE — 82306 VITAMIN D 25 HYDROXY: CPT

## 2022-07-29 PROCEDURE — 80061 LIPID PANEL: CPT

## 2022-09-25 ENCOUNTER — HEALTH MAINTENANCE LETTER (OUTPATIENT)
Age: 42
End: 2022-09-25

## 2022-10-03 ENCOUNTER — IMMUNIZATION (OUTPATIENT)
Dept: FAMILY MEDICINE | Facility: CLINIC | Age: 42
End: 2022-10-03
Payer: COMMERCIAL

## 2022-10-03 DIAGNOSIS — Z23 NEEDS FLU SHOT: Primary | ICD-10-CM

## 2022-10-03 PROCEDURE — 90471 IMMUNIZATION ADMIN: CPT

## 2022-10-03 PROCEDURE — 99207 PR NO CHARGE NURSE ONLY: CPT

## 2022-10-03 PROCEDURE — 90686 IIV4 VACC NO PRSV 0.5 ML IM: CPT

## 2022-12-02 ENCOUNTER — IMMUNIZATION (OUTPATIENT)
Dept: NURSING | Facility: CLINIC | Age: 42
End: 2022-12-02
Payer: COMMERCIAL

## 2022-12-02 PROCEDURE — 91312 COVID-19 VACCINE BIVALENT BOOSTER 12+ (PFIZER): CPT

## 2022-12-02 PROCEDURE — 0124A COVID-19 VACCINE BIVALENT BOOSTER 12+ (PFIZER): CPT

## 2023-08-07 ENCOUNTER — ANCILLARY PROCEDURE (OUTPATIENT)
Dept: MAMMOGRAPHY | Facility: CLINIC | Age: 43
End: 2023-08-07
Attending: FAMILY MEDICINE
Payer: COMMERCIAL

## 2023-08-07 DIAGNOSIS — Z12.31 VISIT FOR SCREENING MAMMOGRAM: ICD-10-CM

## 2023-08-07 PROCEDURE — 77067 SCR MAMMO BI INCL CAD: CPT

## 2023-10-15 ENCOUNTER — HEALTH MAINTENANCE LETTER (OUTPATIENT)
Age: 43
End: 2023-10-15

## 2023-10-24 ENCOUNTER — IMMUNIZATION (OUTPATIENT)
Dept: FAMILY MEDICINE | Facility: CLINIC | Age: 43
End: 2023-10-24
Payer: COMMERCIAL

## 2023-10-24 PROCEDURE — 90686 IIV4 VACC NO PRSV 0.5 ML IM: CPT

## 2023-10-24 PROCEDURE — 91320 SARSCV2 VAC 30MCG TRS-SUC IM: CPT

## 2023-10-24 PROCEDURE — 90471 IMMUNIZATION ADMIN: CPT

## 2023-10-24 PROCEDURE — 90480 ADMN SARSCOV2 VAC 1/ONLY CMP: CPT

## 2023-10-24 NOTE — NURSING NOTE
Prior to immunization administration, verified patients identity using patient s name and date of birth. Please see Immunization Activity for additional information.     Screening Questionnaire for Adult Immunization    Are you sick today?   No   Do you have allergies to medications, food, a vaccine component or latex?   No   Have you ever had a serious reaction after receiving a vaccination?   No   Do you have a long-term health problem with heart, lung, kidney, or metabolic disease (e.g., diabetes), asthma, a blood disorder, no spleen, complement component deficiency, a cochlear implant, or a spinal fluid leak?  Are you on long-term aspirin therapy?   No   Do you have cancer, leukemia, HIV/AIDS, or any other immune system problem?   No   Do you have a parent, brother, or sister with an immune system problem?   No   In the past 3 months, have you taken medications that affect  your immune system, such as prednisone, other steroids, or anticancer drugs; drugs for the treatment of rheumatoid arthritis, Crohn s disease, or psoriasis; or have you had radiation treatments?   No   Have you had a seizure, or a brain or other nervous system problem?   No   During the past year, have you received a transfusion of blood or blood    products, or been given immune (gamma) globulin or antiviral drug?   No   For women: Are you pregnant or is there a chance you could become       pregnant during the next month?   No   Have you received any vaccinations in the past 4 weeks?   No     Immunization questionnaire answers were all negative.    I have reviewed the following standing orders:   This patient is due and qualifies for the Influenza vaccine.    Click here for Influenza Vaccine Standing Order    I have reviewed the vaccines inclusion and exclusion criteria; No concerns regarding eligibility.     Patient instructed to remain in clinic for 15 minutes afterwards, and to report any adverse reactions.     Screening performed by  Brianna Gerardo MA on 10/24/2023 at 10:21 AM.

## 2024-08-09 ENCOUNTER — ANCILLARY PROCEDURE (OUTPATIENT)
Dept: MAMMOGRAPHY | Facility: CLINIC | Age: 44
End: 2024-08-09
Attending: OBSTETRICS & GYNECOLOGY
Payer: COMMERCIAL

## 2024-08-09 DIAGNOSIS — Z12.31 VISIT FOR SCREENING MAMMOGRAM: ICD-10-CM

## 2024-08-09 PROCEDURE — 77063 BREAST TOMOSYNTHESIS BI: CPT

## 2024-08-16 ENCOUNTER — ANCILLARY PROCEDURE (OUTPATIENT)
Dept: MAMMOGRAPHY | Facility: CLINIC | Age: 44
End: 2024-08-16
Attending: OBSTETRICS & GYNECOLOGY
Payer: COMMERCIAL

## 2024-08-16 DIAGNOSIS — N64.89 BREAST ASYMMETRY: ICD-10-CM

## 2024-08-16 PROCEDURE — 77065 DX MAMMO INCL CAD UNI: CPT | Mod: RT

## 2024-09-27 ENCOUNTER — IMMUNIZATION (OUTPATIENT)
Dept: FAMILY MEDICINE | Facility: CLINIC | Age: 44
End: 2024-09-27
Payer: COMMERCIAL

## 2024-09-27 PROCEDURE — 90471 IMMUNIZATION ADMIN: CPT

## 2024-09-27 PROCEDURE — 91320 SARSCV2 VAC 30MCG TRS-SUC IM: CPT

## 2024-09-27 PROCEDURE — 90480 ADMN SARSCOV2 VAC 1/ONLY CMP: CPT

## 2024-09-27 PROCEDURE — 90656 IIV3 VACC NO PRSV 0.5 ML IM: CPT

## 2024-12-07 ENCOUNTER — HEALTH MAINTENANCE LETTER (OUTPATIENT)
Age: 44
End: 2024-12-07

## 2025-03-10 ENCOUNTER — OFFICE VISIT (OUTPATIENT)
Dept: MIDWIFE SERVICES | Facility: CLINIC | Age: 45
End: 2025-03-10
Payer: COMMERCIAL

## 2025-03-10 VITALS
HEIGHT: 64 IN | DIASTOLIC BLOOD PRESSURE: 78 MMHG | SYSTOLIC BLOOD PRESSURE: 128 MMHG | BODY MASS INDEX: 23.51 KG/M2 | OXYGEN SATURATION: 98 % | HEART RATE: 83 BPM | WEIGHT: 137.7 LBS

## 2025-03-10 DIAGNOSIS — N92.0 MENORRHAGIA WITH REGULAR CYCLE: ICD-10-CM

## 2025-03-10 DIAGNOSIS — Z00.00 ANNUAL PHYSICAL EXAM: Primary | ICD-10-CM

## 2025-03-10 DIAGNOSIS — N95.1 PERIMENOPAUSE: ICD-10-CM

## 2025-03-10 LAB
ERYTHROCYTE [DISTWIDTH] IN BLOOD BY AUTOMATED COUNT: 15.7 % (ref 10–15)
HCT VFR BLD AUTO: 39.3 % (ref 35–47)
HGB BLD-MCNC: 12.8 G/DL (ref 11.7–15.7)
MCH RBC QN AUTO: 28.7 PG (ref 26.5–33)
MCHC RBC AUTO-ENTMCNC: 32.6 G/DL (ref 31.5–36.5)
MCV RBC AUTO: 88 FL (ref 78–100)
PLATELET # BLD AUTO: 364 10E3/UL (ref 150–450)
RBC # BLD AUTO: 4.46 10E6/UL (ref 3.8–5.2)
WBC # BLD AUTO: 5.8 10E3/UL (ref 4–11)

## 2025-03-10 PROCEDURE — 85027 COMPLETE CBC AUTOMATED: CPT | Performed by: ADVANCED PRACTICE MIDWIFE

## 2025-03-10 PROCEDURE — 36415 COLL VENOUS BLD VENIPUNCTURE: CPT | Performed by: ADVANCED PRACTICE MIDWIFE

## 2025-03-10 NOTE — PROGRESS NOTES
"Breanna is a 44 year old  female who presents for annual exam.     Menses are regular q 28-30 days and normal lasting  5  days.  Menses flow: normal and heavy.  No LMP recorded.. Using none for contraception.  She is not currently considering pregnancy.  She is not using contraception.  She report that she is \"not really\" sexually active   Besides routine health maintenance,  she would like to discuss heavy periods.  For the last 6 months she had 1 day of very heavy bleeding with her period. She had hemoglobin of slightly over 11 when she went to donate blood.  She has been taking iron since.   She does not notice any other symptoms of perimenopause or anemia.    GYNECOLOGIC HISTORY:  Menarche:  Breanna is sexually active    History sexually transmitted infections:No STD history  STI testing offered?  Declined  KATIA exposure: Unknown  History of abnormal Pap smear: No - age 30- 64 PAP with HPV every 5 years recommended  Family history of breast CA: Yes (Please explain): cousin  Family history of uterine/ovarian CA: No    Family history of colon CA: No    HEALTH MAINTENANCE:  Cholesterol: (  Cholesterol   Date Value Ref Range Status   2022 254 (H) <200 mg/dL Final   2021 226 (H) <=199 mg/dL Final    History of abnormal lipids: Yes  Mammo: 2024 . History of abnormal Mammo: No.  Regular Self Breast Exams: Yes  Calcium/Vitamin D intake: source:  dairy, vit d and calcium Adequate? Yes  TSH: (No results found for: \"TSH\" )  Pap; (No results found for: \"PAP\" )    HISTORY:  OB History    Para Term  AB Living   0 0 0 0 0 0   SAB IAB Ectopic Multiple Live Births   0 0 0 0 0     No past medical history on file.  Past Surgical History:   Procedure Laterality Date    EXCISE LESION TRUNK N/A 2021    Procedure: EXCISION, LESION, Likely Lipoma on the Ab Wall.   SUPINE;  Surgeon: Anibal Brock MD;  Location: [facility] OR    TONSILLECTOMY       Family History   Problem Relation " "Age of Onset    Diabetes Type 2  Mother     Breast Cancer Cousin 40    Breast Cancer Other         maternal great aunt     Social History     Socioeconomic History    Marital status:    Tobacco Use    Smoking status: Never    Smokeless tobacco: Never   Substance and Sexual Activity    Alcohol use: Yes    Drug use: Never    Sexual activity: Yes     Partners: Male     Birth control/protection: None       Current Outpatient Medications:     cholecalciferol (VITAMIN D3) 25 mcg (1000 units) capsule, , Disp: , Rfl:     multivitamin (MULTI-DAY ORAL), [MULTIVITAMIN (MULTI-DAY ORAL)] Take by mouth., Disp: , Rfl:      Allergies   Allergen Reactions    Septra [Sulfamethoxazole-Trimethoprim] Unknown     As a baby, unknown reaction       Past medical, surgical, social and family history were reviewed and updated in EPIC.    ROS:   C:     NEGATIVE for fever, chills, change in weight  I:       NEGATIVE for worrisome rashes, moles or lesions  E:     NEGATIVE for vision changes or irritation  E/M: NEGATIVE for ear, mouth and throat problems  R:     NEGATIVE for significant cough or SOB  CV:   NEGATIVE for chest pain, palpitations or peripheral edema  GI:     NEGATIVE for nausea, abdominal pain, heartburn, or change in bowel habits  :   NEGATIVE for frequency, dysuria, hematuria, vaginal discharge, or irregular bleeding.Heavy bleeding one day per month - new in the last 6 months.   M:     NEGATIVE for significant arthralgias or myalgia  N:      NEGATIVE for weakness, dizziness or paresthesias  E:      NEGATIVE for temperature intolerance, skin/hair changes  P:      NEGATIVE for changes in mood or affect.    EXAM:  /78   Pulse 83   Ht 1.626 m (5' 4\")   Wt 62.5 kg (137 lb 11.2 oz)   LMP 02/22/2025   SpO2 98%   BMI 23.64 kg/m     BMI: Body mass index is 23.64 kg/m .  Constitutional: healthy, alert and no distress  Head: Normocephalic. No masses, lesions, tenderness or abnormalities  Neck: Neck supple. Trachea " midline. No adenopathy. Thyroid symmetric, normal size.   Cardiovascular: RRR.   Respiratory: Negative.   Breast: No nodularity, asymmetry or nipple discharge bilaterally.  Gastrointestinal: Abdomen soft, non-tender, non-distended. No masses, organomegaly.  :  Pelvic exam offered and declined.    Musculoskeletal: extremities normal  Skin: no suspicious lesions or rashes  Psychiatric: Affect appropriate, cooperative,mentation appears normal.     COUNSELING:   Reviewed preventive health counseling, as reflected in patient instructions       Regular exercise       Healthy diet/nutrition       Contraception       (Patricia)menopause management   reports that she has never smoked. She has never used smokeless tobacco.    Body mass index is 23.64 kg/m .    FRAX Risk Assessment    ASSESSMENT:  44 year old female with satisfactory annual exam  (Z00.00) Annual physical exam  (primary encounter diagnosis)  Plan: Return to clinic  in 1 week     (N92.0) Menorrhagia with regular cycle  Plan: CBC with platelets        Offered pelvic US - declined at this time  Discussed birth control or surgical ablation  to help with heavy bleeding.  Recommended iron supplementation.   Hemoglobin checked and normal today.      (N95.1) Perimenopause  Plan: Discussed hormone changes with perimenopause. Period changes and heavy bleeding is common with perimenopause.  Discussed hormonal birth control to help control bleeding.  She declines at this time.

## 2025-04-05 ENCOUNTER — OFFICE VISIT (OUTPATIENT)
Dept: URGENT CARE | Facility: URGENT CARE | Age: 45
End: 2025-04-05
Payer: COMMERCIAL

## 2025-04-05 VITALS
OXYGEN SATURATION: 96 % | TEMPERATURE: 98.2 F | RESPIRATION RATE: 16 BRPM | WEIGHT: 137 LBS | BODY MASS INDEX: 23.52 KG/M2 | DIASTOLIC BLOOD PRESSURE: 78 MMHG | HEART RATE: 86 BPM | SYSTOLIC BLOOD PRESSURE: 118 MMHG

## 2025-04-05 DIAGNOSIS — H00.014 HORDEOLUM EXTERNUM LEFT UPPER EYELID: Primary | ICD-10-CM

## 2025-04-05 PROCEDURE — 99203 OFFICE O/P NEW LOW 30 MIN: CPT | Performed by: PHYSICIAN ASSISTANT

## 2025-04-05 PROCEDURE — 3078F DIAST BP <80 MM HG: CPT | Performed by: PHYSICIAN ASSISTANT

## 2025-04-05 PROCEDURE — 3074F SYST BP LT 130 MM HG: CPT | Performed by: PHYSICIAN ASSISTANT

## 2025-04-05 RX ORDER — ERYTHROMYCIN 5 MG/G
0.5 OINTMENT OPHTHALMIC 3 TIMES DAILY
Qty: 3.5 G | Refills: 0 | Status: SHIPPED | OUTPATIENT
Start: 2025-04-05 | End: 2025-04-12

## 2025-04-05 NOTE — PATIENT INSTRUCTIONS
Patient was educated on the natural course of self-resolving condition. A stye may develop after a small gland in your eyelid gets plugged. Conservative measures include warm moist compresses twice daily for 15 minutes. See your primary care provider if symptoms worsen or do not improve in 10 days. Seek emergency care if you develop fever, severe eye pain, or increased redness. Patient verbalized understanding and is agreeable to plan.

## 2025-04-05 NOTE — PROGRESS NOTES
URGENT CARE VISIT:    SUBJECTIVE:   Breanna Patten is a 44 year old female who presents complaining of mild right eye redness, eyelid swelling for 2 week(s).  Onset/timing was gradual. Associated signs and symptoms include none. She denies vision changes, headache, sore throat, dry cough, fever, chills, and sinus and nasal congestion. She has tried warm packs with no relief of symptoms.  Symptoms were improving and now are worsening.     PMH: No past medical history on file.  Allergies: Septra [sulfamethoxazole-trimethoprim]  Medications:   Current Outpatient Medications   Medication Sig Dispense Refill    cholecalciferol (VITAMIN D3) 25 mcg (1000 units) capsule       erythromycin (ROMYCIN) 5 MG/GM ophthalmic ointment Place 0.5 inches Into the left eye 3 times daily for 7 days. 3.5 g 0    Ferrous Sulfate (IRON PO) Take by mouth.      multivitamin (MULTI-DAY ORAL) [MULTIVITAMIN (MULTI-DAY ORAL)] Take by mouth.       Social History:   Social History     Socioeconomic History    Marital status:      Spouse name: Not on file    Number of children: Not on file    Years of education: Not on file    Highest education level: Not on file   Occupational History    Not on file   Tobacco Use    Smoking status: Never    Smokeless tobacco: Never   Substance and Sexual Activity    Alcohol use: Yes     Comment: occ    Drug use: Never    Sexual activity: Yes     Partners: Male     Birth control/protection: None   Other Topics Concern    Not on file   Social History Narrative    Not on file     Social Drivers of Health     Financial Resource Strain: Not on file   Food Insecurity: Not on file   Transportation Needs: Not on file   Physical Activity: Not on file   Stress: Not on file   Social Connections: Not on file   Interpersonal Safety: Not on file   Housing Stability: Not on file       ROS: ROS otherwise found to be negative except as noted above.    OBJECTIVE:  /78   Pulse 86   Temp 98.2  F (36.8  C) (Temporal)    Resp 16   Wt 62.1 kg (137 lb)   LMP 02/22/2025   SpO2 96%   BMI 23.52 kg/m    General: WDWN in NAD.   Head: normocephalic, atraumatic   Eyes: no conjunctival injection without mattering. There is a 1 cm nodule on right upper mid lid with erythema  Cardiac: RRR without murmurs, rubs, or gallops.  Respiratory: LCTAB without adventitious sounds. Non-labored breathing.  Lymph nodes: no cervical adenopathy.  Skin: no rashes or lesions      ASSESSMENT:     ICD-10-CM    1. Hordeolum externum left upper eyelid  H00.014 erythromycin (ROMYCIN) 5 MG/GM ophthalmic ointment           PLAN:  Patient Instructions   Patient was educated on the natural course of self-resolving condition. A stye may develop after a small gland in your eyelid gets plugged. Conservative measures include warm moist compresses twice daily for 15 minutes. See your primary care provider if symptoms worsen or do not improve in 10 days. Seek emergency care if you develop fever, severe eye pain, or increased redness. Patient verbalized understanding and is agreeable to plan.  Patient verbalized understanding and is agreeable to plan. The patient was discharged ambulatory and in stable condition.    Vanessa Lau PA-C on 4/5/2025 at 3:52 PM

## 2025-08-11 ENCOUNTER — ANCILLARY PROCEDURE (OUTPATIENT)
Dept: MAMMOGRAPHY | Facility: CLINIC | Age: 45
End: 2025-08-11
Attending: OBSTETRICS & GYNECOLOGY
Payer: COMMERCIAL

## 2025-08-11 DIAGNOSIS — Z12.31 VISIT FOR SCREENING MAMMOGRAM: ICD-10-CM

## 2025-08-11 PROCEDURE — 77067 SCR MAMMO BI INCL CAD: CPT
